# Patient Record
Sex: FEMALE | Race: WHITE | NOT HISPANIC OR LATINO | Employment: FULL TIME | URBAN - METROPOLITAN AREA
[De-identification: names, ages, dates, MRNs, and addresses within clinical notes are randomized per-mention and may not be internally consistent; named-entity substitution may affect disease eponyms.]

---

## 2019-10-04 ENCOUNTER — LAB VISIT (OUTPATIENT)
Dept: LAB | Facility: HOSPITAL | Age: 49
End: 2019-10-04
Attending: INTERNAL MEDICINE
Payer: COMMERCIAL

## 2019-10-04 ENCOUNTER — OFFICE VISIT (OUTPATIENT)
Dept: INTERNAL MEDICINE | Facility: CLINIC | Age: 49
End: 2019-10-04
Payer: COMMERCIAL

## 2019-10-04 VITALS
DIASTOLIC BLOOD PRESSURE: 102 MMHG | HEIGHT: 64 IN | BODY MASS INDEX: 29.24 KG/M2 | OXYGEN SATURATION: 100 % | SYSTOLIC BLOOD PRESSURE: 184 MMHG | HEART RATE: 95 BPM | WEIGHT: 171.31 LBS

## 2019-10-04 DIAGNOSIS — Z12.39 BREAST CANCER SCREENING: ICD-10-CM

## 2019-10-04 DIAGNOSIS — Z00.00 ANNUAL PHYSICAL EXAM: ICD-10-CM

## 2019-10-04 DIAGNOSIS — E03.9 HYPOTHYROIDISM, UNSPECIFIED TYPE: ICD-10-CM

## 2019-10-04 DIAGNOSIS — I10 ESSENTIAL HYPERTENSION: ICD-10-CM

## 2019-10-04 LAB
25(OH)D3+25(OH)D2 SERPL-MCNC: 21 NG/ML (ref 30–96)
ALBUMIN SERPL BCP-MCNC: 4 G/DL (ref 3.5–5.2)
ALP SERPL-CCNC: 64 U/L (ref 55–135)
ALT SERPL W/O P-5'-P-CCNC: 26 U/L (ref 10–44)
ANION GAP SERPL CALC-SCNC: 7 MMOL/L (ref 8–16)
AST SERPL-CCNC: 36 U/L (ref 10–40)
BASOPHILS # BLD AUTO: 0.05 K/UL (ref 0–0.2)
BASOPHILS NFR BLD: 0.7 % (ref 0–1.9)
BILIRUB SERPL-MCNC: 0.7 MG/DL (ref 0.1–1)
BUN SERPL-MCNC: 7 MG/DL (ref 6–20)
CALCIUM SERPL-MCNC: 9.8 MG/DL (ref 8.7–10.5)
CHLORIDE SERPL-SCNC: 104 MMOL/L (ref 95–110)
CHOLEST SERPL-MCNC: 225 MG/DL (ref 120–199)
CHOLEST/HDLC SERPL: 3.3 {RATIO} (ref 2–5)
CO2 SERPL-SCNC: 25 MMOL/L (ref 23–29)
CREAT SERPL-MCNC: 0.8 MG/DL (ref 0.5–1.4)
DIFFERENTIAL METHOD: ABNORMAL
EOSINOPHIL # BLD AUTO: 0.6 K/UL (ref 0–0.5)
EOSINOPHIL NFR BLD: 8.3 % (ref 0–8)
ERYTHROCYTE [DISTWIDTH] IN BLOOD BY AUTOMATED COUNT: 12.9 % (ref 11.5–14.5)
EST. GFR  (AFRICAN AMERICAN): >60 ML/MIN/1.73 M^2
EST. GFR  (NON AFRICAN AMERICAN): >60 ML/MIN/1.73 M^2
ESTIMATED AVG GLUCOSE: 94 MG/DL (ref 68–131)
GLUCOSE SERPL-MCNC: 81 MG/DL (ref 70–110)
HBA1C MFR BLD HPLC: 4.9 % (ref 4–5.6)
HCT VFR BLD AUTO: 40 % (ref 37–48.5)
HDLC SERPL-MCNC: 68 MG/DL (ref 40–75)
HDLC SERPL: 30.2 % (ref 20–50)
HGB BLD-MCNC: 12.5 G/DL (ref 12–16)
IMM GRANULOCYTES # BLD AUTO: 0.02 K/UL (ref 0–0.04)
IMM GRANULOCYTES NFR BLD AUTO: 0.3 % (ref 0–0.5)
LDLC SERPL CALC-MCNC: 136.4 MG/DL (ref 63–159)
LYMPHOCYTES # BLD AUTO: 2.2 K/UL (ref 1–4.8)
LYMPHOCYTES NFR BLD: 32.1 % (ref 18–48)
MCH RBC QN AUTO: 32.8 PG (ref 27–31)
MCHC RBC AUTO-ENTMCNC: 31.3 G/DL (ref 32–36)
MCV RBC AUTO: 105 FL (ref 82–98)
MONOCYTES # BLD AUTO: 0.4 K/UL (ref 0.3–1)
MONOCYTES NFR BLD: 6 % (ref 4–15)
NEUTROPHILS # BLD AUTO: 3.6 K/UL (ref 1.8–7.7)
NEUTROPHILS NFR BLD: 52.6 % (ref 38–73)
NONHDLC SERPL-MCNC: 157 MG/DL
NRBC BLD-RTO: 0 /100 WBC
PLATELET # BLD AUTO: 210 K/UL (ref 150–350)
PMV BLD AUTO: 10.7 FL (ref 9.2–12.9)
POTASSIUM SERPL-SCNC: 4.2 MMOL/L (ref 3.5–5.1)
PROT SERPL-MCNC: 7.9 G/DL (ref 6–8.4)
RBC # BLD AUTO: 3.81 M/UL (ref 4–5.4)
SODIUM SERPL-SCNC: 136 MMOL/L (ref 136–145)
TRIGL SERPL-MCNC: 103 MG/DL (ref 30–150)
TSH SERPL DL<=0.005 MIU/L-ACNC: 3.78 UIU/ML (ref 0.4–4)
WBC # BLD AUTO: 6.86 K/UL (ref 3.9–12.7)

## 2019-10-04 PROCEDURE — 99999 PR PBB SHADOW E&M-NEW PATIENT-LVL III: ICD-10-PCS | Mod: PBBFAC,,, | Performed by: INTERNAL MEDICINE

## 2019-10-04 PROCEDURE — 85025 COMPLETE CBC W/AUTO DIFF WBC: CPT

## 2019-10-04 PROCEDURE — 99386 PREV VISIT NEW AGE 40-64: CPT | Mod: S$GLB,,, | Performed by: INTERNAL MEDICINE

## 2019-10-04 PROCEDURE — 99999 PR PBB SHADOW E&M-NEW PATIENT-LVL III: CPT | Mod: PBBFAC,,, | Performed by: INTERNAL MEDICINE

## 2019-10-04 PROCEDURE — 84443 ASSAY THYROID STIM HORMONE: CPT

## 2019-10-04 PROCEDURE — 82306 VITAMIN D 25 HYDROXY: CPT

## 2019-10-04 PROCEDURE — 83036 HEMOGLOBIN GLYCOSYLATED A1C: CPT

## 2019-10-04 PROCEDURE — 80061 LIPID PANEL: CPT

## 2019-10-04 PROCEDURE — 80053 COMPREHEN METABOLIC PANEL: CPT

## 2019-10-04 PROCEDURE — 99386 PR PREVENTIVE VISIT,NEW,40-64: ICD-10-PCS | Mod: S$GLB,,, | Performed by: INTERNAL MEDICINE

## 2019-10-04 PROCEDURE — 36415 COLL VENOUS BLD VENIPUNCTURE: CPT | Mod: PO

## 2019-10-04 RX ORDER — NORETHINDRONE ACETATE AND ETHINYL ESTRADIOL AND FERROUS FUMARATE 1MG-20(21)
1 KIT ORAL DAILY
Refills: 1 | COMMUNITY
Start: 2019-09-19

## 2019-10-04 RX ORDER — LOSARTAN POTASSIUM AND HYDROCHLOROTHIAZIDE 12.5; 5 MG/1; MG/1
1 TABLET ORAL DAILY
Qty: 30 TABLET | Refills: 1 | Status: SHIPPED | OUTPATIENT
Start: 2019-10-04 | End: 2019-11-27 | Stop reason: SDUPTHER

## 2019-10-04 RX ORDER — LABETALOL 200 MG/1
1 TABLET, FILM COATED ORAL 2 TIMES DAILY
Refills: 0 | COMMUNITY
Start: 2019-09-23 | End: 2019-10-04 | Stop reason: SDUPTHER

## 2019-10-04 RX ORDER — LABETALOL 200 MG/1
200 TABLET, FILM COATED ORAL DAILY
Qty: 30 TABLET | Refills: 1 | Status: SHIPPED | OUTPATIENT
Start: 2019-10-04 | End: 2019-11-27

## 2019-10-04 RX ORDER — LEVOTHYROXINE SODIUM 75 UG/1
75 TABLET ORAL DAILY
Refills: 2 | COMMUNITY
Start: 2019-09-21 | End: 2019-10-04 | Stop reason: SDUPTHER

## 2019-10-04 RX ORDER — LEVOTHYROXINE SODIUM 75 UG/1
75 TABLET ORAL DAILY
Qty: 30 TABLET | Refills: 1 | Status: SHIPPED | OUTPATIENT
Start: 2019-10-04 | End: 2019-12-23

## 2019-10-04 NOTE — PROGRESS NOTES
Subjective:       Patient ID: Agnes Floyd is a 49 y.o. female.    Chief Complaint: [facility]    HPI Mrs. Floyd is 49-year-old female with hypertension and hypothyroidism who presents to [facility] and for annual exam.  She is concerned about her blood pressure.  Reports that her pressure has not been well controlled.  She is currently taking labetalol 200 mg p.o. b.i.d..  In the past she has tried hydrochlorothiazide, but it did not work.  Her PCP did some imaging which showed some very small kidney stenosis.  She was then seen by a nephrologist.  Nephrologist determined that this stenosis was not significant enough to be causing her hypertension.  Nephrologist put her on nifedipine, but this caused swelling.  Patient denies any history of migraine headaches, cardiac arrhythmias, or anxiety disorder.  Although she thinks that her doctor may have thought anxiety was playing a role in her blood pressure. BP at home has been 140s/80-90s.    Review of Systems   All other systems reviewed and are negative.      Objective:      Physical Exam   Constitutional: She is oriented to person, place, and time. She appears well-developed and well-nourished. No distress.   HENT:   Head: Normocephalic and atraumatic.   Right Ear: External ear normal.   Left Ear: External ear normal.   Nose: Nose normal.   Mouth/Throat: Oropharynx is clear and moist. No oropharyngeal exudate.   Eyes: Pupils are equal, round, and reactive to light. Conjunctivae and EOM are normal. Right eye exhibits no discharge. Left eye exhibits no discharge. No scleral icterus.   Cardiovascular: Normal rate, regular rhythm, normal heart sounds and intact distal pulses. Exam reveals no gallop and no friction rub.   No murmur heard.  Pulmonary/Chest: Effort normal and breath sounds normal. No stridor. No respiratory distress. She has no wheezes. She has no rales.   Abdominal: Soft. Bowel sounds are normal. She exhibits no distension and no  mass. There is no tenderness. There is no rebound and no guarding.   Musculoskeletal: She exhibits no edema, tenderness or deformity.   Neurological: She is alert and oriented to person, place, and time.   Skin: Skin is warm and dry. No rash noted. She is not diaphoretic. No erythema.   Psychiatric: She has a normal mood and affect. Her behavior is normal. Judgment and thought content normal.   Vitals reviewed.      Assessment:       1. Annual physical exam    2. Essential hypertension    3. Hypothyroidism, unspecified type        Plan:     PAULA-7 score 1 indicating no anxiety disorder.   Hypertension is not well controlled.  Plan to wean down and off labetalol, so decreasing dose to 200 mg daily.  Starting losartan-hydrochlorothiazide today  Labs as below.  Attempting to obtain Pap smear and mammogram records.  Unable to give tetanus booster vaccine in clinic today       Agnes was seen today for establish care.    Diagnoses and all orders for this visit:    Annual physical exam  -     TSH; Future  -     Comprehensive metabolic panel; Future  -     CBC auto differential; Future  -     Hemoglobin A1c; Future  -     Lipid panel; Future  -     Vitamin D; Future    Essential hypertension  -     losartan-hydrochlorothiazide 50-12.5 mg (HYZAAR) 50-12.5 mg per tablet; Take 1 tablet by mouth once daily.  -     labetalol (NORMODYNE) 200 MG tablet; Take 1 tablet (200 mg total) by mouth once daily.    Hypothyroidism, unspecified type  -     levothyroxine (SYNTHROID) 75 MCG tablet; Take 1 tablet (75 mcg total) by mouth once daily.      RTC 2-4 weeks, f/u HTN

## 2019-10-31 ENCOUNTER — TELEPHONE (OUTPATIENT)
Dept: INTERNAL MEDICINE | Facility: CLINIC | Age: 49
End: 2019-10-31

## 2019-10-31 NOTE — TELEPHONE ENCOUNTER
Spoke with pt, she wanted to cancel appt on Friday, says she received a bill from her [employer] company and was told Dr. Ceballos was out of network.

## 2019-10-31 NOTE — TELEPHONE ENCOUNTER
----- Message from Radha Crenshaw sent at 10/31/2019  2:53 PM CDT -----  Contact: [phone]/self   Patient requesting to speak with you concerning Dr. Ceballos not being under her insurance plan.   Please call back to assist at [phone]

## 2019-11-17 DIAGNOSIS — I10 ESSENTIAL HYPERTENSION: ICD-10-CM

## 2019-11-18 RX ORDER — LOSARTAN POTASSIUM AND HYDROCHLOROTHIAZIDE 12.5; 5 MG/1; MG/1
1 TABLET ORAL DAILY
Qty: 30 TABLET | Refills: 1 | OUTPATIENT
Start: 2019-11-18 | End: 2020-11-17

## 2019-11-27 ENCOUNTER — OFFICE VISIT (OUTPATIENT)
Dept: INTERNAL MEDICINE | Facility: CLINIC | Age: 49
End: 2019-11-27
Payer: COMMERCIAL

## 2019-11-27 VITALS
HEART RATE: 118 BPM | WEIGHT: 168.63 LBS | TEMPERATURE: 99 F | HEIGHT: 64 IN | BODY MASS INDEX: 28.79 KG/M2 | OXYGEN SATURATION: 98 % | DIASTOLIC BLOOD PRESSURE: 98 MMHG | SYSTOLIC BLOOD PRESSURE: 166 MMHG

## 2019-11-27 DIAGNOSIS — J30.9 ALLERGIC RHINITIS, UNSPECIFIED SEASONALITY, UNSPECIFIED TRIGGER: ICD-10-CM

## 2019-11-27 DIAGNOSIS — R00.0 TACHYCARDIA: ICD-10-CM

## 2019-11-27 DIAGNOSIS — I10 ESSENTIAL HYPERTENSION: Primary | ICD-10-CM

## 2019-11-27 PROCEDURE — 99213 OFFICE O/P EST LOW 20 MIN: CPT | Mod: S$GLB,,, | Performed by: INTERNAL MEDICINE

## 2019-11-27 PROCEDURE — 99999 PR PBB SHADOW E&M-EST. PATIENT-LVL III: CPT | Mod: PBBFAC,,, | Performed by: INTERNAL MEDICINE

## 2019-11-27 PROCEDURE — 3008F BODY MASS INDEX DOCD: CPT | Mod: CPTII,S$GLB,, | Performed by: INTERNAL MEDICINE

## 2019-11-27 PROCEDURE — 99999 PR PBB SHADOW E&M-EST. PATIENT-LVL III: ICD-10-PCS | Mod: PBBFAC,,, | Performed by: INTERNAL MEDICINE

## 2019-11-27 PROCEDURE — 99213 PR OFFICE/OUTPT VISIT, EST, LEVL III, 20-29 MIN: ICD-10-PCS | Mod: S$GLB,,, | Performed by: INTERNAL MEDICINE

## 2019-11-27 PROCEDURE — 3008F PR BODY MASS INDEX (BMI) DOCUMENTED: ICD-10-PCS | Mod: CPTII,S$GLB,, | Performed by: INTERNAL MEDICINE

## 2019-11-27 RX ORDER — LABETALOL 100 MG/1
100 TABLET, FILM COATED ORAL 2 TIMES DAILY
Qty: 60 TABLET | Refills: 3 | Status: SHIPPED | OUTPATIENT
Start: 2019-11-27 | End: 2020-03-06

## 2019-11-27 RX ORDER — LOSARTAN POTASSIUM AND HYDROCHLOROTHIAZIDE 12.5; 5 MG/1; MG/1
1 TABLET ORAL DAILY
Qty: 30 TABLET | Refills: 3 | Status: SHIPPED | OUTPATIENT
Start: 2019-11-27 | End: 2020-03-06

## 2019-11-27 NOTE — PATIENT INSTRUCTIONS
Please check your blood pressure at home once daily.  Please write these numbers down and send these numbers to Dr. Ceballos in about two weeks so we can monitor your BP over the patient portal.    You may use the following medications for allergy symptoms: zyrtec OR xyzal once daily. Can also use nasal spray such as flonase OR nasonex, one spray twice daily.  Finally you can use nasal saline rinses once or twice daily

## 2019-11-27 NOTE — PROGRESS NOTES
Subjective:       Patient ID: Agnes Floyd is a 49 y.o. female.    Chief Complaint: Follow-up (1 month bp f/u)    HPI Mrs. Floyd is a 49 year old female with hypertension who presents for follow-up of hypertension.  At last visit, she was given instructions to reduce her labetalol dose. I was going to do a gradual wean of the labetalol medication. However, patient has weaned herself down and now off of the labetalol completely. She has not had labetalol in about 4 weeks. She feels her heart racing or beating fast at times. She felt this before she started the labetalol a few years ago. Reports that she never had any evaluation to see why she was having a fast heart rate. But her prior doctor did suspect that anxiety may be playing a role. Today she is only taking losartan-HCTZ 50-12.5 mg daily and her BP is 166/98 with HR of 118. She denies feeling anxious or nervous in clinic today.  She complains of sinus symptoms such as runny nose and nasal congestion.    Review of Systems   HENT: Positive for congestion and rhinorrhea.    Cardiovascular:        Feels fast heart rate   All other systems reviewed and are negative.      Objective:      Physical Exam   Constitutional: She is oriented to person, place, and time. She appears well-developed and well-nourished. No distress.   HENT:   Head: Normocephalic and atraumatic.   Right Ear: External ear normal.   Left Ear: External ear normal.   Nose: Nose normal.   Eyes: Conjunctivae and EOM are normal.   Cardiovascular: Regular rhythm, normal heart sounds and intact distal pulses. Tachycardia present. Exam reveals no gallop and no friction rub.   No murmur heard.  Pulmonary/Chest: Effort normal and breath sounds normal. No stridor. No respiratory distress. She has no wheezes. She has no rales.   Neurological: She is alert and oriented to person, place, and time.   Skin: Skin is warm and dry. She is not diaphoretic.   Psychiatric: She has a normal mood and affect.  Her behavior is normal. Judgment and thought content normal.   Vitals reviewed.      Assessment:       1. Essential hypertension Sub-optimally controlled   2. Tachycardia Active   3. Allergic rhinitis, unspecified seasonality, unspecified trigger Active       Plan:         Agnes was seen today for follow-up.    Diagnoses and all orders for this visit:    Essential hypertension  Comments:  Add labetalol back at low dose of 100 mg BID. Continue losartan-HCTZ at current dose. See instructions in AVS  Orders:  -     labetalol (NORMODYNE) 100 MG tablet; Take 1 tablet (100 mg total) by mouth 2 (two) times daily.  -     losartan-hydrochlorothiazide 50-12.5 mg (HYZAAR) 50-12.5 mg per tablet; Take 1 tablet by mouth once daily.    Tachycardia  Comments:  Etiology unclear. TSH normal. Will check EKG and Echo when patient returns to clinic. Not checking now at the request of patient due to financial concerns.    Allergic rhinitis, unspecified seasonality, unspecified trigger  Comments:  See instructions in AVS.     RTC in January, f/u HTN and tachycardia

## 2019-12-23 DIAGNOSIS — E03.9 HYPOTHYROIDISM, UNSPECIFIED TYPE: ICD-10-CM

## 2019-12-23 RX ORDER — LEVOTHYROXINE SODIUM 75 UG/1
TABLET ORAL
Qty: 30 TABLET | Refills: 1 | Status: SHIPPED | OUTPATIENT
Start: 2019-12-23 | End: 2020-01-08 | Stop reason: SDUPTHER

## 2020-01-08 DIAGNOSIS — E03.9 HYPOTHYROIDISM, UNSPECIFIED TYPE: ICD-10-CM

## 2020-01-08 RX ORDER — LEVOTHYROXINE SODIUM 75 UG/1
75 TABLET ORAL DAILY
Qty: 30 TABLET | Refills: 1 | Status: SHIPPED | OUTPATIENT
Start: 2020-01-08 | End: 2020-03-06

## 2020-01-31 ENCOUNTER — PATIENT OUTREACH (OUTPATIENT)
Dept: ADMINISTRATIVE | Facility: HOSPITAL | Age: 50
End: 2020-01-31

## 2020-01-31 NOTE — LETTER
January 31, 2020    Agnes Floyd  [address] [facility] [place] [address]         [facility]  [address]  [facility] [address]  Phone: [phone] Dear Agnes Floyd    [facility].  To help you get the most out of each of your visits, we will review your information to make sure you are up to date on all of your recommended tests and/or procedures.      A review of your chart shows you may be due for   Health Maintenance Due   Topic    TETANUS VACCINE     Pap Smear with HPV Cotest     Mammogram     .     If you have had any of the above done at another facility, please bring the records or information with you so that your record at [facility] will be complete.  If you would like to schedule any of these, please contact me.    If you are currently taking medication, please bring it with you to your appointment for review.    Also, if you have any type of Advanced Directives, please bring them with you to your office visit so we may scan them into your chart.    [facility] LPN   Clinical Care Coordinator  [facility] [place]

## 2020-02-05 ENCOUNTER — PATIENT OUTREACH (OUTPATIENT)
Dept: ADMINISTRATIVE | Facility: OTHER | Age: 50
End: 2020-02-05

## 2020-02-07 ENCOUNTER — OFFICE VISIT (OUTPATIENT)
Dept: OTOLARYNGOLOGY | Facility: CLINIC | Age: 50
End: 2020-02-07
Payer: COMMERCIAL

## 2020-02-07 VITALS
BODY MASS INDEX: 28.73 KG/M2 | TEMPERATURE: 97 F | HEIGHT: 64 IN | DIASTOLIC BLOOD PRESSURE: 99 MMHG | WEIGHT: 168.31 LBS | SYSTOLIC BLOOD PRESSURE: 183 MMHG | HEART RATE: 76 BPM

## 2020-02-07 DIAGNOSIS — J34.2 NASAL SEPTAL DEVIATION: ICD-10-CM

## 2020-02-07 DIAGNOSIS — R43.8 HYPOSMIA: ICD-10-CM

## 2020-02-07 DIAGNOSIS — J31.0 CHRONIC RHINITIS: Primary | ICD-10-CM

## 2020-02-07 PROCEDURE — 99203 OFFICE O/P NEW LOW 30 MIN: CPT | Mod: 25,S$GLB,, | Performed by: OTOLARYNGOLOGY

## 2020-02-07 PROCEDURE — 3008F BODY MASS INDEX DOCD: CPT | Mod: CPTII,S$GLB,, | Performed by: OTOLARYNGOLOGY

## 2020-02-07 PROCEDURE — 3077F SYST BP >= 140 MM HG: CPT | Mod: CPTII,S$GLB,, | Performed by: OTOLARYNGOLOGY

## 2020-02-07 PROCEDURE — 3008F PR BODY MASS INDEX (BMI) DOCUMENTED: ICD-10-PCS | Mod: CPTII,S$GLB,, | Performed by: OTOLARYNGOLOGY

## 2020-02-07 PROCEDURE — 99999 PR PBB SHADOW E&M-EST. PATIENT-LVL III: ICD-10-PCS | Mod: PBBFAC,,, | Performed by: OTOLARYNGOLOGY

## 2020-02-07 PROCEDURE — 31231 NASAL ENDOSCOPY DX: CPT | Mod: S$GLB,,, | Performed by: OTOLARYNGOLOGY

## 2020-02-07 PROCEDURE — 3077F PR MOST RECENT SYSTOLIC BLOOD PRESSURE >= 140 MM HG: ICD-10-PCS | Mod: CPTII,S$GLB,, | Performed by: OTOLARYNGOLOGY

## 2020-02-07 PROCEDURE — 3080F PR MOST RECENT DIASTOLIC BLOOD PRESSURE >= 90 MM HG: ICD-10-PCS | Mod: CPTII,S$GLB,, | Performed by: OTOLARYNGOLOGY

## 2020-02-07 PROCEDURE — 31231 PR NASAL ENDOSCOPY, DX: ICD-10-PCS | Mod: S$GLB,,, | Performed by: OTOLARYNGOLOGY

## 2020-02-07 PROCEDURE — 99999 PR PBB SHADOW E&M-EST. PATIENT-LVL III: CPT | Mod: PBBFAC,,, | Performed by: OTOLARYNGOLOGY

## 2020-02-07 PROCEDURE — 99203 PR OFFICE/OUTPT VISIT, NEW, LEVL III, 30-44 MIN: ICD-10-PCS | Mod: 25,S$GLB,, | Performed by: OTOLARYNGOLOGY

## 2020-02-07 PROCEDURE — 3080F DIAST BP >= 90 MM HG: CPT | Mod: CPTII,S$GLB,, | Performed by: OTOLARYNGOLOGY

## 2020-02-07 RX ORDER — AZELASTINE 1 MG/ML
1 SPRAY, METERED NASAL 2 TIMES DAILY
Qty: 30 ML | Refills: 3 | Status: SHIPPED | OUTPATIENT
Start: 2020-02-07 | End: 2022-10-10

## 2020-02-07 RX ORDER — BETAMETHASONE DIPROPIONATE 0.5 MG/G
CREAM TOPICAL 2 TIMES DAILY
Qty: 15 G | Refills: 1 | Status: SHIPPED | OUTPATIENT
Start: 2020-02-07 | End: 2021-02-12

## 2020-02-07 NOTE — PROCEDURES
Procedure: Nasal endoscopy    Anesthesia: Topical xylocaine with joanie-synephrine    Complications: None    Findings: edema, no mass, no polyps, no purulence    Procedure in Detail: After verbal consent obtained and risks, benefits and alternatives discussed with patient, nares were decongested and anesthetized, and a flexible laryngoscope was passed with following results:  Septum deviated to the left. Inferior turbinates with moderate hypertrophy. Middle turbinates edematous. Superior turbinates edematous. Middle meatus seems to be blocked by some edema, but no purulence, no polyps. Superior meatus clear. Sphenoethmoidal recess clear.     Patient tolerated the procedure well.

## 2020-02-07 NOTE — PROGRESS NOTES
Otolaryngology Clinic Note    Agnes Floyd  Encounter Date: 2/7/2020   YOB: 1970  Referring Physician: Aaareferral Self  No address on file   PCP: Thea Ceballos MD    Chief Complaint:   Chief Complaint   Patient presents with    Other     not stuffed up, but has lost sense of smell and taste. Started 4-5 months ago.        HPI: Agnes Floyd is a 49 y.o. female here for decreased smell and taste. Can smell some but it is decreased. Denies any associated URI. No trauma. No prior episodes.    Occasional rhinorrhea but no significant complaint. Intermittent sneezing. Eye will itch and water at times. No facial pressure or pain. No post nasal drip. Generally feels she can breathe out of nose but feels some blockage. No epistaxis. No headaches. No visual changes.    Tried flonase for a few weeks with no difference a while back.    No history of allergies.    Review of Systems   Constitutional: Negative for chills, fever and weight loss.   HENT: Negative for congestion, ear pain, hearing loss and sinus pain.    Eyes: Negative for blurred vision and double vision.   Respiratory: Negative for cough and shortness of breath.    Cardiovascular: Negative for chest pain and palpitations.   Gastrointestinal: Negative for nausea and vomiting.   Musculoskeletal: Negative for joint pain and neck pain.   Skin: Negative for rash.   Neurological: Negative for dizziness, focal weakness and headaches.   Psychiatric/Behavioral: Negative for depression. The patient is not nervous/anxious.         Review of patient's allergies indicates:  No Known Allergies    Past Medical History:   Diagnosis Date    Hypertension     Hypothyroidism        Past Surgical History:   Procedure Laterality Date    TONSILLECTOMY         Social History     Socioeconomic History    Marital status:      Spouse name: Not on file    Number of children: Not on file    Years of education: Not on file    Highest  education level: Not on file   Occupational History    Not on file   Social Needs    Financial resource strain: Not on file    Food insecurity:     Worry: Not on file     Inability: Not on file    Transportation needs:     Medical: Not on file     Non-medical: Not on file   Tobacco Use    Smoking status: Never Smoker    Smokeless tobacco: Never Used   Substance and Sexual Activity    Alcohol use: Yes    Drug use: Never    Sexual activity: Yes     Partners: Male   Lifestyle    Physical activity:     Days per week: Not on file     Minutes per session: Not on file    Stress: Not at all   Relationships    Social connections:     Talks on phone: Not on file     Gets together: Not on file     Attends Buddhist service: Not on file     Active member of club or organization: Not on file     Attends meetings of clubs or organizations: Not on file     Relationship status: Not on file   Other Topics Concern    Not on file   Social History Narrative    Not on file       Family History   Problem Relation Age of Onset    Hypertension Father        Outpatient Encounter Medications as of 2/7/2020   Medication Sig Dispense Refill    JUNEL FE 1/20, 28, 1 mg-20 mcg (21)/75 mg (7) per tablet Take 1 tablet by mouth once daily.  1    labetalol (NORMODYNE) 100 MG tablet Take 1 tablet (100 mg total) by mouth 2 (two) times daily. 60 tablet 3    levothyroxine (SYNTHROID) 75 MCG tablet Take 1 tablet (75 mcg total) by mouth once daily. 30 tablet 1    losartan-hydrochlorothiazide 50-12.5 mg (HYZAAR) 50-12.5 mg per tablet Take 1 tablet by mouth once daily. 30 tablet 3    azelastine (ASTELIN) 137 mcg (0.1 %) nasal spray 1 spray (137 mcg total) by Nasal route 2 (two) times daily. 30 mL 3    betamethasone dipropionate (DIPROLENE) 0.05 % cream Apply topically 2 (two) times daily. Use 2 cm strip in sinus rinse twice daily 15 g 1     No facility-administered encounter medications on file as of 2/7/2020.        Physical  Exam:    Vitals:    02/07/20 1033   BP: (!) 183/99   Pulse: 76   Temp: 97.2 °F (36.2 °C)       Constitutional  General Appearance: well nourished, well-developed, alert, oriented, in no acute distress  Communication: ability, understanding, voice quality normal  Head and Face  Inspection: normocephalic, atraumatic, no scars, lesions or masses    Palpation: no stepoffs, sinus tenderness or masses  Parotid glands: no masses, stones, swelling or tenderness  Eyes  Ocular Motility / Alignment: normal alignment, motility, no proptosis, enophthalmus or nystagmus  Conjunctiva: not injected  Eyelids: no entropion or ectropion, no edema  Ears  Hearing: speech reception thresholds grossly normal  External Ears: no auricle lesions, non-tender, mobile to palpation  Otoscopy:  Right Ear: no tympanic membrane lesions, perforations, or effusion, normal EAC  Left Ear: no tympanic membrane lesions, perforations, or effusion, normal EAC  Nose  External Nose: no lesions, tenderness, trauma or deformity  Intranasal Exam: see procedure note - left septal deviation  Oral Cavity / Oropharynx  Lips: upper and lower lips pink and moist  Teeth: dentition good  Gingiva: healthy  Oral Mucosa: moist, no mucosal lesions  Floor of Mouth: normal, no lesions  Tongue: moist, normal mobility, no lesions  Palate: soft and hard palates without lesions or ulcers  Oropharynx: tonsils and walls without erythema, exudate, lesions, fullness or obstruction  Neck  Inspection and Palpation: no erythema, induration, emphysema, tenderness or masses  Larynx and Trachea: normal position and mobility   Thyroid: no tenderness, enlargement or nodules  Submandibular Glands: no masses or tenderness  Lymphatic:  Anterior, Posterior, Submandibular, Submental, Supraclavicular: no lymphadenopathy present  Chest / Respiratory  Chest: no stridor or retractions, normal effort and expansion  Cardiovascular:  Pulses: 2+ radial pulses, regular rhythm and rate  Auscultation:  deferred  Neurological  Cranial Nerves: grossly intact  General: no focal deficits  Psychiatric  Orientation: oriented to time, place and person  Mood and Affect: no depression, anxiety or agitation  Extremities  Inspection: moves all extremities well    Procedures:  Procedure: Nasal endoscopy    Anesthesia: Topical xylocaine with joanie-synephrine    Complications: None    Findings: edema, no mass, no polyps, no purulence    Procedure in Detail: After verbal consent obtained and risks, benefits and alternatives discussed with patient, nares were decongested and anesthetized, and a flexible laryngoscope was passed with following results:  Septum deviated to the left. Inferior turbinates with moderate hypertrophy. Middle turbinates edematous. Superior turbinates edematous. Middle meatus seems to be blocked by some edema, but no purulence, no polyps. Superior meatus clear. Sphenoethmoidal recess clear.     Patient tolerated the procedure well.    Pertinent Data:  ? LABS:   ? AUDIO:  ? PATH:      I personally reviewed the following pertinent data at today's visit:    Imaging:   ? XRAY:  ? Ultrasound:  ? CT Scan:  ? MRI Scan:  ? PET/CT Scan:    I personally reviewed the following images:    Summary of Outside Records:      Assessment and Plan:  Agnes Floyd is a 49 y.o. female with     Chronic rhinitis  -     betamethasone dipropionate (DIPROLENE) 0.05 % cream; Apply topically 2 (two) times daily. Use 2 cm strip in sinus rinse twice daily  Dispense: 15 g; Refill: 1  -     azelastine (ASTELIN) 137 mcg (0.1 %) nasal spray; 1 spray (137 mcg total) by Nasal route 2 (two) times daily.  Dispense: 30 mL; Refill: 3    Nasal septal deviation    Hyposmia       Patient with some nasal mucosal edema, no obvious polyps, purulence or mass. Will start on nasal steroid irrigations and some astelin. Discussed options for imaging. Patient with no other concerning symptoms to suspect tumor as cause. No history of trauma. Seems  reasonable to wait to see if she responds to steroid rinses. RTC in 4 weeks. If no change, will consider MRI at next visit.        E. Grier de Lauréal, MD [facility]

## 2020-02-14 ENCOUNTER — OFFICE VISIT (OUTPATIENT)
Dept: INTERNAL MEDICINE | Facility: CLINIC | Age: 50
End: 2020-02-14
Payer: COMMERCIAL

## 2020-02-14 VITALS
OXYGEN SATURATION: 100 % | BODY MASS INDEX: 28.72 KG/M2 | DIASTOLIC BLOOD PRESSURE: 90 MMHG | WEIGHT: 167.31 LBS | HEART RATE: 97 BPM | SYSTOLIC BLOOD PRESSURE: 150 MMHG

## 2020-02-14 DIAGNOSIS — I10 ESSENTIAL HYPERTENSION: ICD-10-CM

## 2020-02-14 DIAGNOSIS — R00.0 TACHYCARDIA: ICD-10-CM

## 2020-02-14 DIAGNOSIS — I10 WHITE COAT SYNDROME WITH DIAGNOSIS OF HYPERTENSION: ICD-10-CM

## 2020-02-14 PROCEDURE — 99213 PR OFFICE/OUTPT VISIT, EST, LEVL III, 20-29 MIN: ICD-10-PCS | Mod: S$GLB,,, | Performed by: INTERNAL MEDICINE

## 2020-02-14 PROCEDURE — 93010 EKG 12-LEAD: ICD-10-PCS | Mod: S$GLB,,, | Performed by: INTERNAL MEDICINE

## 2020-02-14 PROCEDURE — 3078F PR MOST RECENT DIASTOLIC BLOOD PRESSURE < 80 MM HG: ICD-10-PCS | Mod: CPTII,S$GLB,, | Performed by: INTERNAL MEDICINE

## 2020-02-14 PROCEDURE — 99999 PR PBB SHADOW E&M-EST. PATIENT-LVL III: CPT | Mod: PBBFAC,,, | Performed by: INTERNAL MEDICINE

## 2020-02-14 PROCEDURE — 93005 EKG 12-LEAD: ICD-10-PCS | Mod: S$GLB,,, | Performed by: INTERNAL MEDICINE

## 2020-02-14 PROCEDURE — 3074F SYST BP LT 130 MM HG: CPT | Mod: CPTII,S$GLB,, | Performed by: INTERNAL MEDICINE

## 2020-02-14 PROCEDURE — 93010 ELECTROCARDIOGRAM REPORT: CPT | Mod: S$GLB,,, | Performed by: INTERNAL MEDICINE

## 2020-02-14 PROCEDURE — 3008F PR BODY MASS INDEX (BMI) DOCUMENTED: ICD-10-PCS | Mod: CPTII,S$GLB,, | Performed by: INTERNAL MEDICINE

## 2020-02-14 PROCEDURE — 3008F BODY MASS INDEX DOCD: CPT | Mod: CPTII,S$GLB,, | Performed by: INTERNAL MEDICINE

## 2020-02-14 PROCEDURE — 3074F PR MOST RECENT SYSTOLIC BLOOD PRESSURE < 130 MM HG: ICD-10-PCS | Mod: CPTII,S$GLB,, | Performed by: INTERNAL MEDICINE

## 2020-02-14 PROCEDURE — 99213 OFFICE O/P EST LOW 20 MIN: CPT | Mod: S$GLB,,, | Performed by: INTERNAL MEDICINE

## 2020-02-14 PROCEDURE — 99999 PR PBB SHADOW E&M-EST. PATIENT-LVL III: ICD-10-PCS | Mod: PBBFAC,,, | Performed by: INTERNAL MEDICINE

## 2020-02-14 PROCEDURE — 3078F DIAST BP <80 MM HG: CPT | Mod: CPTII,S$GLB,, | Performed by: INTERNAL MEDICINE

## 2020-02-14 PROCEDURE — 93005 ELECTROCARDIOGRAM TRACING: CPT | Mod: S$GLB,,, | Performed by: INTERNAL MEDICINE

## 2020-02-14 NOTE — PROGRESS NOTES
Patient ID: Agnes Floyd is a 49 y.o. female.    Chief Complaint: Hypertension (follow up )    HPI Agnes is a 49 y.o. female With hypertension and tachycardia who presents for follow-up of hypertension.  At last visit, she was restarted on labetalol.  She is currently compliant with labetalol 100 mg b.i.d. And losartan-hydrochlorothiazide.  Patient reports checking her blood pressure at home and seeing good numbers such as systolic 117-120s and diastolic 80s. Thinks she gets nervous at the doctor's office and this causes BP to go up. BP was elevated at recent visit with ENT  No acute complaints or concerns today.    Review of Systems   All other systems reviewed and are negative.        Objective:     Vitals:    02/14/20 1306   BP: (!) 150/90   Pulse:         Physical Exam   Constitutional: She is oriented to person, place, and time. She appears well-developed and well-nourished.   HENT:   Head: Normocephalic and atraumatic.   Right Ear: External ear normal.   Left Ear: External ear normal.   Nose: Nose normal.   Eyes: Conjunctivae and EOM are normal.   Neurological: She is alert and oriented to person, place, and time.   Skin: Skin is warm and dry. She is not diaphoretic.   Psychiatric: She has a normal mood and affect. Her behavior is normal. Judgment and thought content normal.   Vitals reviewed.      Assessment:       1. Essential hypertension Well controlled   2. Tachycardia Active   3. White coat syndrome with diagnosis of hypertension Active       Plan:         Essential hypertension  Comments:  Appears to be well controlled at home. Continue to monitor at home. See AVS for instructions. Continue current meds  Orders:  -     Comprehensive metabolic panel; Future; Expected date: 05/14/2020    Tachycardia  Comments:  Etiology unclear. Possibly due to anxiety? TSH normal. EKG normal. Will check Echo  Orders:  -     EKG 12-lead; Future  -     Echo Color Flow Doppler? Yes; Future    White coat syndrome  with diagnosis of hypertension  Comments:  Appears to be well controlled at home. Continue to monitor at home. See AVS for instructions. Continue current meds        RTC 4-5 months, f/u HTN    Warning signs discussed, patient to call with any further issues or worsening of symptoms.       Parts of the above note were dictated using a voice dictation software. Please excuse any grammatical or typographical errors.

## 2020-02-19 ENCOUNTER — TELEPHONE (OUTPATIENT)
Dept: ADMINISTRATIVE | Facility: OTHER | Age: 50
End: 2020-02-19

## 2020-03-06 DIAGNOSIS — E03.9 HYPOTHYROIDISM, UNSPECIFIED TYPE: ICD-10-CM

## 2020-03-06 DIAGNOSIS — I10 ESSENTIAL HYPERTENSION: ICD-10-CM

## 2020-03-06 RX ORDER — LABETALOL 100 MG/1
TABLET, FILM COATED ORAL
Qty: 60 TABLET | Refills: 3 | Status: SHIPPED | OUTPATIENT
Start: 2020-03-06 | End: 2020-07-06

## 2020-03-06 RX ORDER — LOSARTAN POTASSIUM AND HYDROCHLOROTHIAZIDE 12.5; 5 MG/1; MG/1
1 TABLET ORAL DAILY
Qty: 30 TABLET | Refills: 3 | Status: SHIPPED | OUTPATIENT
Start: 2020-03-06 | End: 2020-04-24 | Stop reason: ALTCHOICE

## 2020-03-06 RX ORDER — LEVOTHYROXINE SODIUM 75 UG/1
TABLET ORAL
Qty: 30 TABLET | Refills: 1 | Status: SHIPPED | OUTPATIENT
Start: 2020-03-06 | End: 2020-07-24

## 2020-04-24 RX ORDER — LOSARTAN POTASSIUM 50 MG/1
50 TABLET ORAL DAILY
Qty: 90 TABLET | Refills: 0 | Status: SHIPPED | OUTPATIENT
Start: 2020-04-24 | End: 2020-05-20

## 2020-04-24 RX ORDER — HYDROCHLOROTHIAZIDE 12.5 MG/1
12.5 TABLET ORAL DAILY
Qty: 30 TABLET | Refills: 0 | Status: SHIPPED | OUTPATIENT
Start: 2020-04-24 | End: 2020-05-20

## 2020-05-20 RX ORDER — LOSARTAN POTASSIUM 50 MG/1
TABLET ORAL
Qty: 90 TABLET | Refills: 0 | Status: SHIPPED | OUTPATIENT
Start: 2020-05-20 | End: 2020-12-31

## 2020-05-20 RX ORDER — HYDROCHLOROTHIAZIDE 12.5 MG/1
TABLET ORAL
Qty: 30 TABLET | Refills: 0 | Status: SHIPPED | OUTPATIENT
Start: 2020-05-20 | End: 2020-07-06

## 2020-06-11 ENCOUNTER — PATIENT OUTREACH (OUTPATIENT)
Dept: ADMINISTRATIVE | Facility: HOSPITAL | Age: 50
End: 2020-06-11

## 2020-06-17 ENCOUNTER — TELEPHONE (OUTPATIENT)
Dept: INTERNAL MEDICINE | Facility: CLINIC | Age: 50
End: 2020-06-17

## 2020-08-19 ENCOUNTER — LAB VISIT (OUTPATIENT)
Dept: LAB | Facility: HOSPITAL | Age: 50
End: 2020-08-19
Attending: INTERNAL MEDICINE
Payer: COMMERCIAL

## 2020-08-19 DIAGNOSIS — I10 ESSENTIAL HYPERTENSION: ICD-10-CM

## 2020-08-19 PROCEDURE — 36415 COLL VENOUS BLD VENIPUNCTURE: CPT | Mod: PO

## 2020-08-19 PROCEDURE — 80053 COMPREHEN METABOLIC PANEL: CPT

## 2020-08-20 LAB
ALBUMIN SERPL BCP-MCNC: 3.8 G/DL (ref 3.5–5.2)
ALP SERPL-CCNC: 62 U/L (ref 55–135)
ALT SERPL W/O P-5'-P-CCNC: 62 U/L (ref 10–44)
ANION GAP SERPL CALC-SCNC: 10 MMOL/L (ref 8–16)
AST SERPL-CCNC: 80 U/L (ref 10–40)
BILIRUB SERPL-MCNC: 0.5 MG/DL (ref 0.1–1)
BUN SERPL-MCNC: 8 MG/DL (ref 6–20)
CALCIUM SERPL-MCNC: 9.5 MG/DL (ref 8.7–10.5)
CHLORIDE SERPL-SCNC: 101 MMOL/L (ref 95–110)
CO2 SERPL-SCNC: 25 MMOL/L (ref 23–29)
CREAT SERPL-MCNC: 0.7 MG/DL (ref 0.5–1.4)
EST. GFR  (AFRICAN AMERICAN): >60 ML/MIN/1.73 M^2
EST. GFR  (NON AFRICAN AMERICAN): >60 ML/MIN/1.73 M^2
GLUCOSE SERPL-MCNC: 81 MG/DL (ref 70–110)
POTASSIUM SERPL-SCNC: 4 MMOL/L (ref 3.5–5.1)
PROT SERPL-MCNC: 7.7 G/DL (ref 6–8.4)
SODIUM SERPL-SCNC: 136 MMOL/L (ref 136–145)

## 2020-08-21 ENCOUNTER — HOSPITAL ENCOUNTER (OUTPATIENT)
Dept: RADIOLOGY | Facility: HOSPITAL | Age: 50
Discharge: HOME | End: 2020-08-21
Attending: INTERNAL MEDICINE
Payer: COMMERCIAL

## 2020-08-21 ENCOUNTER — OFFICE VISIT (OUTPATIENT)
Dept: INTERNAL MEDICINE | Facility: CLINIC | Age: 50
End: 2020-08-21
Payer: COMMERCIAL

## 2020-08-21 VITALS
BODY MASS INDEX: 29.29 KG/M2 | TEMPERATURE: 97 F | HEART RATE: 97 BPM | OXYGEN SATURATION: 98 % | SYSTOLIC BLOOD PRESSURE: 128 MMHG | DIASTOLIC BLOOD PRESSURE: 88 MMHG | WEIGHT: 170.63 LBS

## 2020-08-21 DIAGNOSIS — Z00.00 ANNUAL PHYSICAL EXAM: ICD-10-CM

## 2020-08-21 DIAGNOSIS — R06.2 WHEEZING: ICD-10-CM

## 2020-08-21 DIAGNOSIS — J30.1 SEASONAL ALLERGIC RHINITIS DUE TO POLLEN: ICD-10-CM

## 2020-08-21 DIAGNOSIS — I10 ESSENTIAL HYPERTENSION: ICD-10-CM

## 2020-08-21 DIAGNOSIS — R74.8 ELEVATED LIVER ENZYMES: ICD-10-CM

## 2020-08-21 PROCEDURE — 99214 OFFICE O/P EST MOD 30 MIN: CPT | Mod: S$GLB,,, | Performed by: INTERNAL MEDICINE

## 2020-08-21 PROCEDURE — 99999 PR PBB SHADOW E&M-EST. PATIENT-LVL IV: CPT | Mod: PBBFAC,,, | Performed by: INTERNAL MEDICINE

## 2020-08-21 PROCEDURE — 3074F PR MOST RECENT SYSTOLIC BLOOD PRESSURE < 130 MM HG: ICD-10-PCS | Mod: CPTII,S$GLB,, | Performed by: INTERNAL MEDICINE

## 2020-08-21 PROCEDURE — 71046 XR CHEST PA AND LATERAL: ICD-10-PCS | Mod: 26,,, | Performed by: RADIOLOGY

## 2020-08-21 PROCEDURE — 71046 X-RAY EXAM CHEST 2 VIEWS: CPT | Mod: 26,,, | Performed by: RADIOLOGY

## 2020-08-21 PROCEDURE — 3079F DIAST BP 80-89 MM HG: CPT | Mod: CPTII,S$GLB,, | Performed by: INTERNAL MEDICINE

## 2020-08-21 PROCEDURE — 99999 PR PBB SHADOW E&M-EST. PATIENT-LVL IV: ICD-10-PCS | Mod: PBBFAC,,, | Performed by: INTERNAL MEDICINE

## 2020-08-21 PROCEDURE — 3079F PR MOST RECENT DIASTOLIC BLOOD PRESSURE 80-89 MM HG: ICD-10-PCS | Mod: CPTII,S$GLB,, | Performed by: INTERNAL MEDICINE

## 2020-08-21 PROCEDURE — 3074F SYST BP LT 130 MM HG: CPT | Mod: CPTII,S$GLB,, | Performed by: INTERNAL MEDICINE

## 2020-08-21 PROCEDURE — 99214 PR OFFICE/OUTPT VISIT, EST, LEVL IV, 30-39 MIN: ICD-10-PCS | Mod: S$GLB,,, | Performed by: INTERNAL MEDICINE

## 2020-08-21 PROCEDURE — 71046 X-RAY EXAM CHEST 2 VIEWS: CPT | Mod: TC,FY,PO

## 2020-08-21 PROCEDURE — 3008F PR BODY MASS INDEX (BMI) DOCUMENTED: ICD-10-PCS | Mod: CPTII,S$GLB,, | Performed by: INTERNAL MEDICINE

## 2020-08-21 PROCEDURE — 3008F BODY MASS INDEX DOCD: CPT | Mod: CPTII,S$GLB,, | Performed by: INTERNAL MEDICINE

## 2020-08-21 RX ORDER — ALBUTEROL SULFATE 90 UG/1
2 AEROSOL, METERED RESPIRATORY (INHALATION) EVERY 4 HOURS PRN
Qty: 18 G | Refills: 1 | Status: SHIPPED | OUTPATIENT
Start: 2020-08-21 | End: 2021-05-07

## 2020-08-21 RX ORDER — METHYLPREDNISOLONE 4 MG/1
TABLET ORAL
Qty: 21 TABLET | Refills: 0 | Status: SHIPPED | OUTPATIENT
Start: 2020-08-21 | End: 2020-09-11

## 2020-08-21 NOTE — PROGRESS NOTES
Patient ID: Agnes Floyd is a 50 y.o. female.    Chief Complaint: Follow-up (medications)    HPI Agnes is a 50 y.o. female with seasonal allergies, HTN and anxiety who presents for follow up of HTN. Reports BP is good at home such as 120/77. Systolic pressure never >130 at home. She is compliant with labetalol and losartan. Complains of seasonal allergy symptoms. Onset was 2 weeks ago. She takes claritin as needed. Not using any nasal rinses or nasal sprays now. Nothing making symptoms better. Having runny nose with associated symptoms of cough and sore throat. No associated ear pain or postnasal drip. Symptoms are constant in duration.    Review of Systems   HENT: Positive for rhinorrhea and sore throat. Negative for ear pain and postnasal drip.    Respiratory: Positive for cough.    All other systems reviewed and are negative.      Objective:     Vitals:    08/21/20 0922   BP: 128/88   Pulse:    Temp:         Physical Exam  Vitals signs reviewed.   Constitutional:       General: She is not in acute distress.     Appearance: Normal appearance. She is well-developed and normal weight. She is not toxic-appearing or diaphoretic.   HENT:      Head: Normocephalic and atraumatic.      Right Ear: External ear normal.      Left Ear: External ear normal.      Nose: Nose normal.   Eyes:      General: No scleral icterus.        Right eye: No discharge.         Left eye: No discharge.      Extraocular Movements: Extraocular movements intact.      Conjunctiva/sclera: Conjunctivae normal.   Cardiovascular:      Rate and Rhythm: Normal rate and regular rhythm.      Heart sounds: Normal heart sounds. No murmur. No friction rub. No gallop.    Pulmonary:      Effort: Pulmonary effort is normal. No respiratory distress.      Breath sounds: No stridor. Wheezing (mild, expiratory, diffuse) and rhonchi (mild, diffuse, bilateral) present. No rales.   Skin:     General: Skin is warm and dry.   Neurological:      General: No  focal deficit present.      Mental Status: She is alert and oriented to person, place, and time. Mental status is at baseline.   Psychiatric:         Mood and Affect: Mood normal.         Behavior: Behavior normal.         Thought Content: Thought content normal.         Judgment: Judgment normal.         Assessment:       1. Essential hypertension Well controlled   2. Seasonal allergic rhinitis due to pollen Active   3. Wheezing Active   4. Elevated liver enzymes Active   5. Annual physical exam        Plan:         Essential hypertension  Comments:  Continue current medications    Seasonal allergic rhinitis due to pollen  Comments:  See instructions in AVS    Wheezing  -     X-Ray Chest PA And Lateral; Future; Expected date: 08/21/2020  -     albuterol (PROVENTIL/VENTOLIN HFA) 90 mcg/actuation inhaler; Inhale 2 puffs into the lungs every 4 (four) hours as needed for Wheezing or Shortness of Breath (or cough).  Dispense: 18 g; Refill: 1  -     methylPREDNISolone (MEDROL DOSEPACK) 4 mg tablet; use as directed  Dispense: 21 tablet; Refill: 0    Elevated liver enzymes  Comments:  see instructions in AVS  Orders:  -     Comprehensive metabolic panel; Future; Expected date: 11/21/2020    Annual physical exam  -     Comprehensive metabolic panel; Future; Expected date: 11/21/2020  -     CBC auto differential; Future; Expected date: 11/21/2020  -     Hemoglobin A1C; Future; Expected date: 11/21/2020  -     Lipid Panel; Future; Expected date: 11/21/2020  -     TSH; Future; Expected date: 11/21/2020  -     Hepatitis C Antibody; Future; Expected date: 11/21/2020  -     HIV 1/2 Ag/Ab (4th Gen); Future; Expected date: 11/21/2020        RTC December 2020 for annual exam    Warning signs discussed, patient to call with any further issues or worsening of symptoms.       Parts of the above note were dictated using a voice dictation software. Please excuse any grammatical or typographical errors.

## 2020-08-21 NOTE — PATIENT INSTRUCTIONS
Try over the counter xyzal 5 mg by mouth once daily. Use flonase nasal spray, one spray in each nostril twice daily. Use this regimen for the next one month. After one month, you may continue the regimen or discontinue it with the plans to restart if symptoms return.     I am prescribing steroid medication and albuterol medication for relief as well.    No alcohol, no tylenol and will repeat liver labs in 6 weeks

## 2020-09-25 ENCOUNTER — PATIENT MESSAGE (OUTPATIENT)
Dept: OTHER | Facility: OTHER | Age: 50
End: 2020-09-25

## 2020-10-05 ENCOUNTER — PATIENT MESSAGE (OUTPATIENT)
Dept: ADMINISTRATIVE | Facility: HOSPITAL | Age: 50
End: 2020-10-05

## 2020-12-04 DIAGNOSIS — Z12.31 OTHER SCREENING MAMMOGRAM: ICD-10-CM

## 2020-12-11 ENCOUNTER — PATIENT MESSAGE (OUTPATIENT)
Dept: OTHER | Facility: OTHER | Age: 50
End: 2020-12-11

## 2020-12-11 DIAGNOSIS — I10 ESSENTIAL HYPERTENSION: ICD-10-CM

## 2020-12-11 RX ORDER — LABETALOL 100 MG/1
TABLET, FILM COATED ORAL
Qty: 60 TABLET | Refills: 0 | Status: SHIPPED | OUTPATIENT
Start: 2020-12-11 | End: 2021-01-11

## 2020-12-11 RX ORDER — HYDROCHLOROTHIAZIDE 12.5 MG/1
TABLET ORAL
Qty: 30 TABLET | Refills: 0 | Status: SHIPPED | OUTPATIENT
Start: 2020-12-11 | End: 2021-01-11

## 2020-12-19 ENCOUNTER — CLINICAL SUPPORT (OUTPATIENT)
Dept: URGENT CARE | Facility: CLINIC | Age: 50
End: 2020-12-19
Payer: COMMERCIAL

## 2020-12-19 DIAGNOSIS — Z20.822 COVID-19 RULED OUT: Primary | ICD-10-CM

## 2020-12-19 LAB
CTP QC/QA: YES
SARS-COV-2 RDRP RESP QL NAA+PROBE: NEGATIVE

## 2020-12-19 PROCEDURE — U0002: ICD-10-PCS | Mod: QW,S$GLB,, | Performed by: PHYSICIAN ASSISTANT

## 2020-12-19 PROCEDURE — U0002 COVID-19 LAB TEST NON-CDC: HCPCS | Mod: QW,S$GLB,, | Performed by: PHYSICIAN ASSISTANT

## 2020-12-19 NOTE — PROGRESS NOTES
CDC Testing and Quarantine Guidelines for Exposure:         A close exposure is defined as anyone who has had an exposure (masked or unmasked) to a known COVID -19 positive person within 6 ft for longer than 15 minutes. If your exposure meets this definition you are required by CDC guidelines to quarantine for at least 7-10 days from time of exposure. The CDC states that a test can be performed for an asymptomatic patient (someone who does not have any symptoms) after a close exposure, and that a test should be done if you develop symptoms after a close exposure as described above.  Specifically, you can test at day 5 or later if asymptomatic, in order to get released from quarantine on day 7 or later.  If you develop symptoms sooner, you should test when your symptoms start.  If you meet the definition of a close exposure, it will not matter whether you are experiencing symptoms- a quarantine for at least 7-10 days after a close exposure is required by CDC guidelines.  Please note, if you decide to test as an asymptomatic during your quarantine and you are positive, you will be restarting your quarantine and moving from a possible 10 day quarantine (if you do not test), to a 11 day or greater quarantine.  The CDC also suggests people still monitor for symptoms for a full 14 days and remember that the shorter quarantine options do not replace initial CDC guidance.  The CDC continues to recommend quarantining for 14 days as the best way to reduce risk for spreading COVID-19 - however, this is only a recommendation.  If your exposure does not meet the above definition, you can return to your normal daily activities to include social distancing, wearing a mask and frequent handwashing.

## 2021-01-05 ENCOUNTER — PATIENT MESSAGE (OUTPATIENT)
Dept: ADMINISTRATIVE | Facility: HOSPITAL | Age: 51
End: 2021-01-05

## 2021-02-10 ENCOUNTER — LAB VISIT (OUTPATIENT)
Dept: LAB | Facility: HOSPITAL | Age: 51
End: 2021-02-10
Attending: INTERNAL MEDICINE
Payer: COMMERCIAL

## 2021-02-10 DIAGNOSIS — R74.8 ELEVATED LIVER ENZYMES: ICD-10-CM

## 2021-02-10 DIAGNOSIS — Z00.00 ANNUAL PHYSICAL EXAM: ICD-10-CM

## 2021-02-10 LAB
BASOPHILS # BLD AUTO: 0.05 K/UL (ref 0–0.2)
BASOPHILS NFR BLD: 0.8 % (ref 0–1.9)
DIFFERENTIAL METHOD: ABNORMAL
EOSINOPHIL # BLD AUTO: 0.4 K/UL (ref 0–0.5)
EOSINOPHIL NFR BLD: 6.8 % (ref 0–8)
ERYTHROCYTE [DISTWIDTH] IN BLOOD BY AUTOMATED COUNT: 12.8 % (ref 11.5–14.5)
ESTIMATED AVG GLUCOSE: 94 MG/DL (ref 68–131)
HBA1C MFR BLD: 4.9 % (ref 4–5.6)
HCT VFR BLD AUTO: 39.4 % (ref 37–48.5)
HGB BLD-MCNC: 12.6 G/DL (ref 12–16)
IMM GRANULOCYTES # BLD AUTO: 0.01 K/UL (ref 0–0.04)
IMM GRANULOCYTES NFR BLD AUTO: 0.2 % (ref 0–0.5)
LYMPHOCYTES # BLD AUTO: 2 K/UL (ref 1–4.8)
LYMPHOCYTES NFR BLD: 32.7 % (ref 18–48)
MCH RBC QN AUTO: 33.4 PG (ref 27–31)
MCHC RBC AUTO-ENTMCNC: 32 G/DL (ref 32–36)
MCV RBC AUTO: 105 FL (ref 82–98)
MONOCYTES # BLD AUTO: 0.4 K/UL (ref 0.3–1)
MONOCYTES NFR BLD: 6.3 % (ref 4–15)
NEUTROPHILS # BLD AUTO: 3.2 K/UL (ref 1.8–7.7)
NEUTROPHILS NFR BLD: 53.2 % (ref 38–73)
NRBC BLD-RTO: 0 /100 WBC
PLATELET # BLD AUTO: 217 K/UL (ref 150–350)
PMV BLD AUTO: 10.4 FL (ref 9.2–12.9)
RBC # BLD AUTO: 3.77 M/UL (ref 4–5.4)
WBC # BLD AUTO: 6 K/UL (ref 3.9–12.7)

## 2021-02-10 PROCEDURE — 84439 ASSAY OF FREE THYROXINE: CPT

## 2021-02-10 PROCEDURE — 36415 COLL VENOUS BLD VENIPUNCTURE: CPT | Mod: PO

## 2021-02-10 PROCEDURE — 80053 COMPREHEN METABOLIC PANEL: CPT

## 2021-02-10 PROCEDURE — 86703 HIV-1/HIV-2 1 RESULT ANTBDY: CPT

## 2021-02-10 PROCEDURE — 85025 COMPLETE CBC W/AUTO DIFF WBC: CPT

## 2021-02-10 PROCEDURE — 83036 HEMOGLOBIN GLYCOSYLATED A1C: CPT

## 2021-02-10 PROCEDURE — 80061 LIPID PANEL: CPT

## 2021-02-10 PROCEDURE — 86803 HEPATITIS C AB TEST: CPT

## 2021-02-10 PROCEDURE — 84443 ASSAY THYROID STIM HORMONE: CPT

## 2021-02-11 LAB
ALBUMIN SERPL BCP-MCNC: 3.9 G/DL (ref 3.5–5.2)
ALP SERPL-CCNC: 76 U/L (ref 55–135)
ALT SERPL W/O P-5'-P-CCNC: 41 U/L (ref 10–44)
ANION GAP SERPL CALC-SCNC: 12 MMOL/L (ref 8–16)
AST SERPL-CCNC: 42 U/L (ref 10–40)
BILIRUB SERPL-MCNC: 1.3 MG/DL (ref 0.1–1)
BUN SERPL-MCNC: 9 MG/DL (ref 6–20)
CALCIUM SERPL-MCNC: 9.4 MG/DL (ref 8.7–10.5)
CHLORIDE SERPL-SCNC: 97 MMOL/L (ref 95–110)
CHOLEST SERPL-MCNC: 240 MG/DL (ref 120–199)
CHOLEST/HDLC SERPL: 3.4 {RATIO} (ref 2–5)
CO2 SERPL-SCNC: 22 MMOL/L (ref 23–29)
CREAT SERPL-MCNC: 0.8 MG/DL (ref 0.5–1.4)
EST. GFR  (AFRICAN AMERICAN): >60 ML/MIN/1.73 M^2
EST. GFR  (NON AFRICAN AMERICAN): >60 ML/MIN/1.73 M^2
GLUCOSE SERPL-MCNC: 89 MG/DL (ref 70–110)
HCV AB SERPL QL IA: NEGATIVE
HDLC SERPL-MCNC: 71 MG/DL (ref 40–75)
HDLC SERPL: 29.6 % (ref 20–50)
HIV 1+2 AB+HIV1 P24 AG SERPL QL IA: NEGATIVE
LDLC SERPL CALC-MCNC: 142.2 MG/DL (ref 63–159)
NONHDLC SERPL-MCNC: 169 MG/DL
POTASSIUM SERPL-SCNC: 3.5 MMOL/L (ref 3.5–5.1)
PROT SERPL-MCNC: 7.7 G/DL (ref 6–8.4)
SODIUM SERPL-SCNC: 131 MMOL/L (ref 136–145)
T4 FREE SERPL-MCNC: 0.97 NG/DL (ref 0.71–1.51)
TRIGL SERPL-MCNC: 134 MG/DL (ref 30–150)
TSH SERPL DL<=0.005 MIU/L-ACNC: 4.15 UIU/ML (ref 0.4–4)

## 2021-02-12 ENCOUNTER — OFFICE VISIT (OUTPATIENT)
Dept: INTERNAL MEDICINE | Facility: CLINIC | Age: 51
End: 2021-02-12
Payer: COMMERCIAL

## 2021-02-12 ENCOUNTER — PATIENT OUTREACH (OUTPATIENT)
Dept: ADMINISTRATIVE | Facility: HOSPITAL | Age: 51
End: 2021-02-12

## 2021-02-12 VITALS
SYSTOLIC BLOOD PRESSURE: 142 MMHG | HEART RATE: 85 BPM | BODY MASS INDEX: 29.35 KG/M2 | WEIGHT: 171.94 LBS | OXYGEN SATURATION: 100 % | DIASTOLIC BLOOD PRESSURE: 88 MMHG | TEMPERATURE: 97 F | HEIGHT: 64 IN

## 2021-02-12 DIAGNOSIS — I10 ESSENTIAL HYPERTENSION: ICD-10-CM

## 2021-02-12 DIAGNOSIS — H61.22 IMPACTED CERUMEN OF LEFT EAR: ICD-10-CM

## 2021-02-12 DIAGNOSIS — Z00.00 ANNUAL PHYSICAL EXAM: Primary | ICD-10-CM

## 2021-02-12 DIAGNOSIS — E03.9 HYPOTHYROIDISM, UNSPECIFIED TYPE: ICD-10-CM

## 2021-02-12 DIAGNOSIS — E87.1 HYPONATREMIA: ICD-10-CM

## 2021-02-12 DIAGNOSIS — R74.8 ELEVATED LIVER ENZYMES: ICD-10-CM

## 2021-02-12 PROCEDURE — 3079F PR MOST RECENT DIASTOLIC BLOOD PRESSURE 80-89 MM HG: ICD-10-PCS | Mod: CPTII,S$GLB,, | Performed by: INTERNAL MEDICINE

## 2021-02-12 PROCEDURE — 1126F PR PAIN SEVERITY QUANTIFIED, NO PAIN PRESENT: ICD-10-PCS | Mod: S$GLB,,, | Performed by: INTERNAL MEDICINE

## 2021-02-12 PROCEDURE — 99396 PREV VISIT EST AGE 40-64: CPT | Mod: S$GLB,,, | Performed by: INTERNAL MEDICINE

## 2021-02-12 PROCEDURE — 1126F AMNT PAIN NOTED NONE PRSNT: CPT | Mod: S$GLB,,, | Performed by: INTERNAL MEDICINE

## 2021-02-12 PROCEDURE — 99999 PR PBB SHADOW E&M-EST. PATIENT-LVL IV: ICD-10-PCS | Mod: PBBFAC,,, | Performed by: INTERNAL MEDICINE

## 2021-02-12 PROCEDURE — 3079F DIAST BP 80-89 MM HG: CPT | Mod: CPTII,S$GLB,, | Performed by: INTERNAL MEDICINE

## 2021-02-12 PROCEDURE — 3077F PR MOST RECENT SYSTOLIC BLOOD PRESSURE >= 140 MM HG: ICD-10-PCS | Mod: CPTII,S$GLB,, | Performed by: INTERNAL MEDICINE

## 2021-02-12 PROCEDURE — 99396 PR PREVENTIVE VISIT,EST,40-64: ICD-10-PCS | Mod: S$GLB,,, | Performed by: INTERNAL MEDICINE

## 2021-02-12 PROCEDURE — 3077F SYST BP >= 140 MM HG: CPT | Mod: CPTII,S$GLB,, | Performed by: INTERNAL MEDICINE

## 2021-02-12 PROCEDURE — 3008F BODY MASS INDEX DOCD: CPT | Mod: CPTII,S$GLB,, | Performed by: INTERNAL MEDICINE

## 2021-02-12 PROCEDURE — 99999 PR PBB SHADOW E&M-EST. PATIENT-LVL IV: CPT | Mod: PBBFAC,,, | Performed by: INTERNAL MEDICINE

## 2021-02-12 PROCEDURE — 3008F PR BODY MASS INDEX (BMI) DOCUMENTED: ICD-10-PCS | Mod: CPTII,S$GLB,, | Performed by: INTERNAL MEDICINE

## 2021-03-01 DIAGNOSIS — E03.9 HYPOTHYROIDISM, UNSPECIFIED TYPE: ICD-10-CM

## 2021-03-01 RX ORDER — LEVOTHYROXINE SODIUM 75 UG/1
TABLET ORAL
Qty: 30 TABLET | Refills: 0 | Status: SHIPPED | OUTPATIENT
Start: 2021-03-01 | End: 2021-04-12

## 2021-04-16 ENCOUNTER — PATIENT MESSAGE (OUTPATIENT)
Dept: RESEARCH | Facility: HOSPITAL | Age: 51
End: 2021-04-16

## 2021-07-06 ENCOUNTER — PATIENT MESSAGE (OUTPATIENT)
Dept: ADMINISTRATIVE | Facility: HOSPITAL | Age: 51
End: 2021-07-06

## 2021-07-20 ENCOUNTER — OFFICE VISIT (OUTPATIENT)
Dept: URGENT CARE | Facility: CLINIC | Age: 51
End: 2021-07-20
Payer: COMMERCIAL

## 2021-07-20 VITALS
TEMPERATURE: 98 F | RESPIRATION RATE: 16 BRPM | SYSTOLIC BLOOD PRESSURE: 139 MMHG | HEIGHT: 64 IN | WEIGHT: 160 LBS | OXYGEN SATURATION: 98 % | HEART RATE: 78 BPM | BODY MASS INDEX: 27.31 KG/M2 | DIASTOLIC BLOOD PRESSURE: 88 MMHG

## 2021-07-20 DIAGNOSIS — Z20.822 EXPOSURE TO COVID-19 VIRUS: Primary | ICD-10-CM

## 2021-07-20 DIAGNOSIS — R19.7 DIARRHEA, UNSPECIFIED TYPE: ICD-10-CM

## 2021-07-20 LAB
CTP QC/QA: YES
SARS-COV-2 RDRP RESP QL NAA+PROBE: NEGATIVE

## 2021-07-20 PROCEDURE — 3079F DIAST BP 80-89 MM HG: CPT | Mod: CPTII,S$GLB,, | Performed by: FAMILY MEDICINE

## 2021-07-20 PROCEDURE — U0002 COVID-19 LAB TEST NON-CDC: HCPCS | Mod: QW,S$GLB,, | Performed by: FAMILY MEDICINE

## 2021-07-20 PROCEDURE — 99213 PR OFFICE/OUTPT VISIT, EST, LEVL III, 20-29 MIN: ICD-10-PCS | Mod: S$GLB,CS,, | Performed by: FAMILY MEDICINE

## 2021-07-20 PROCEDURE — 3075F SYST BP GE 130 - 139MM HG: CPT | Mod: CPTII,S$GLB,, | Performed by: FAMILY MEDICINE

## 2021-07-20 PROCEDURE — 3008F BODY MASS INDEX DOCD: CPT | Mod: CPTII,S$GLB,, | Performed by: FAMILY MEDICINE

## 2021-07-20 PROCEDURE — 3075F PR MOST RECENT SYSTOLIC BLOOD PRESS GE 130-139MM HG: ICD-10-PCS | Mod: CPTII,S$GLB,, | Performed by: FAMILY MEDICINE

## 2021-07-20 PROCEDURE — 99213 OFFICE O/P EST LOW 20 MIN: CPT | Mod: S$GLB,CS,, | Performed by: FAMILY MEDICINE

## 2021-07-20 PROCEDURE — U0002: ICD-10-PCS | Mod: QW,S$GLB,, | Performed by: FAMILY MEDICINE

## 2021-07-20 PROCEDURE — 3079F PR MOST RECENT DIASTOLIC BLOOD PRESSURE 80-89 MM HG: ICD-10-PCS | Mod: CPTII,S$GLB,, | Performed by: FAMILY MEDICINE

## 2021-07-20 PROCEDURE — 3008F PR BODY MASS INDEX (BMI) DOCUMENTED: ICD-10-PCS | Mod: CPTII,S$GLB,, | Performed by: FAMILY MEDICINE

## 2021-10-04 ENCOUNTER — PATIENT MESSAGE (OUTPATIENT)
Dept: ADMINISTRATIVE | Facility: HOSPITAL | Age: 51
End: 2021-10-04

## 2021-12-08 DIAGNOSIS — I10 ESSENTIAL HYPERTENSION: ICD-10-CM

## 2021-12-10 RX ORDER — LABETALOL 100 MG/1
TABLET, FILM COATED ORAL
Qty: 180 TABLET | Refills: 0 | Status: SHIPPED | OUTPATIENT
Start: 2021-12-10 | End: 2022-06-27 | Stop reason: SDUPTHER

## 2022-01-01 NOTE — TELEPHONE ENCOUNTER
----- Message from Leonarda Conroy sent at 11/18/2019  2:41 PM CST -----  Contact: self  Patient is requesting a call back concerning getting an appt on Friday 11/22, states she also needs her medication refilled. Please call      0

## 2022-01-05 DIAGNOSIS — E03.9 HYPOTHYROIDISM, UNSPECIFIED TYPE: ICD-10-CM

## 2022-01-05 NOTE — TELEPHONE ENCOUNTER
No new care gaps identified.  Powered by [employer] "[employer]". Reference number: [id number].   1/05/2022 3:36:02 PM CST

## 2022-01-07 RX ORDER — LOSARTAN POTASSIUM 50 MG/1
TABLET ORAL
Qty: 90 TABLET | Refills: 0 | Status: SHIPPED | OUTPATIENT
Start: 2022-01-07 | End: 2022-04-07

## 2022-01-07 RX ORDER — LEVOTHYROXINE SODIUM 75 UG/1
TABLET ORAL
Qty: 90 TABLET | Refills: 0 | Status: SHIPPED | OUTPATIENT
Start: 2022-01-07 | End: 2022-04-07

## 2022-01-07 NOTE — TELEPHONE ENCOUNTER
Refill Authorization Note   Agnes Floyd  is requesting a refill authorization.  Brief Assessment and Rationale for Refill:  Approve     Medication Therapy Plan:  Ft4 WNL    Medication Reconciliation Completed: No   Comments:   --->Care Gap information included below if applicable.   Orders Placed This Encounter    losartan (COZAAR) 50 MG tablet    levothyroxine (SYNTHROID) 75 MCG tablet      Requested Prescriptions   Signed Prescriptions Disp Refills    losartan (COZAAR) 50 MG tablet 90 tablet 0     Sig: TAKE 1 TABLET BY MOUTH ONCE DAILY       Cardiovascular:  Angiotensin Receptor Blockers Passed - 1/5/2022  3:35 PM        Passed - Patient is at least 18 years old        Passed - Negative Pregnancy Status Check        Passed - Last BP in normal range within 360 days     BP Readings from Last 1 Encounters:   07/20/21 139/88               Passed - Valid encounter within last 15 months     Recent Visits  Date Type Provider Dept   02/12/21 Office Visit Thea Ceballos MD [facility]   08/21/20 Office Visit Thea Ceballos MD [facility]   02/14/20 Office Visit Thea Ceballos MD [facility]   Showing recent visits within past 720 days and meeting all other requirements  Future Appointments  No visits were found meeting these conditions.  Showing future appointments within next 150 days and meeting all other requirements                Passed - Cr is 1.39 or below and within 360 days     Lab Results   Component Value Date    CREATININE 0.8 02/10/2021    CREATININE 0.7 08/19/2020    CREATININE 0.8 10/04/2019              Passed - K is 5.2 or below and within 360 days     Potassium   Date Value Ref Range Status   02/10/2021 3.5 3.5 - 5.1 mmol/L Final   08/19/2020 4.0 3.5 - 5.1 mmol/L Final   10/04/2019 4.2 3.5 - 5.1 mmol/L Final              Passed - eGFR within 360 days     Lab Results   Component Value Date    EGFRNONAA >60.0 02/10/2021    EGFRNONAA >60.0 08/19/2020     EGFRNONAA >60.0 10/04/2019                  levothyroxine (SYNTHROID) 75 MCG tablet 90 tablet 0     Sig: TAKE 1 TABLET BY MOUTH ONCE DAILY       Endocrinology:  Hypothyroid Agents Failed - 1/5/2022  3:35 PM        Failed - TSH in normal range and within 360 days     TSH   Date Value Ref Range Status   02/10/2021 4.152 (H) 0.400 - 4.000 uIU/mL Final   10/04/2019 3.781 0.400 - 4.000 uIU/mL Final              Passed - Patient is at least 18 years old        Passed - Negative Pregnancy Status Check        Passed - Valid encounter within last 15 months     Recent Visits  Date Type Provider Dept   02/12/21 Office Visit Thea Ceballos MD [facility]   08/21/20 Office Visit Thea Ceballos MD [facility]   02/14/20 Office Visit Thea Ceballos MD [facility]   Showing recent visits within past 720 days and meeting all other requirements  Future Appointments  No visits were found meeting these conditions.  Showing future appointments within next 150 days and meeting all other requirements                Passed - Manual Review: FT4 is not required if last TSH is WNL.        Passed - T4 free within 1080 days     Free T4   Date Value Ref Range Status   02/10/2021 0.97 0.71 - 1.51 ng/dL Final                  Appointments  past 12m or future 3m with PCP    Date Provider   Last Visit   2/12/2021 Thea Ceballos MD   Next Visit   Visit date not found Thea Ceballos MD   ED visits in past 90 days: 0     Note composed:10:16 AM 01/07/2022

## 2022-04-05 DIAGNOSIS — E03.9 HYPOTHYROIDISM, UNSPECIFIED TYPE: ICD-10-CM

## 2022-04-05 NOTE — TELEPHONE ENCOUNTER
Care Due:                  Date            Visit Type   Department     Provider  --------------------------------------------------------------------------------                                EP -                              PRIMARY      [facility] INTERNAL  Last Visit: 02-      CARE ([facility])   MEDICINE       Thea Ceballos  Next Visit: None Scheduled  None         None Found                                                            Last  Test          Frequency    Reason                     Performed    Due Date  --------------------------------------------------------------------------------    Office Visit  12 months..  hydroCHLOROthiazide,       02- 02-                             losartan.................    CMP.........  12 months..  hydroCHLOROthiazide,       02-   02-                             losartan.................    Powered by [employer]. Reference number: [id number].   4/05/2022 12:52:38 PM CDT

## 2022-04-07 RX ORDER — LOSARTAN POTASSIUM 50 MG/1
TABLET ORAL
Qty: 90 TABLET | Refills: 0 | Status: SHIPPED | OUTPATIENT
Start: 2022-04-07 | End: 2022-07-05

## 2022-04-07 RX ORDER — LEVOTHYROXINE SODIUM 75 UG/1
TABLET ORAL
Qty: 90 TABLET | Refills: 0 | Status: SHIPPED | OUTPATIENT
Start: 2022-04-07 | End: 2022-07-05

## 2022-04-07 NOTE — TELEPHONE ENCOUNTER
Refill Routing Note   Medication(s) are not appropriate for processing by [facility] for the following reason(s):      - Required laboratory values are outdated  - Required laboratory values are abnormal    [facility] action(s):  Defer  Approve Medication-related problems identified: Requires labs              Appointments  past 12m or future 3m with PCP    Date Provider   Last Visit   2/12/2021 Thea Ceballos MD   Next Visit   Visit date not found Thea Ceballos MD   ED visits in past 90 days: 0        Note composed:8:38 AM 04/07/2022

## 2022-05-31 ENCOUNTER — PATIENT MESSAGE (OUTPATIENT)
Dept: ADMINISTRATIVE | Facility: HOSPITAL | Age: 52
End: 2022-05-31
Payer: COMMERCIAL

## 2022-06-08 DIAGNOSIS — Z12.11 COLON CANCER SCREENING: ICD-10-CM

## 2022-06-27 DIAGNOSIS — I10 ESSENTIAL HYPERTENSION: ICD-10-CM

## 2022-06-27 NOTE — TELEPHONE ENCOUNTER
----- Message from Yojana Regalado sent at 6/27/2022 12:34 PM CDT -----  Regarding: annual labs /refill  Contact: [phone]  Patient is requesting orders for Annual lab work sent to [facility].   Would the patient rather a call back or a response via [facility]?  Call   Best Call Back Number:  [phone]  Additional Information:      Type:  RX Refill Request    Who Called:  self   Refill or New Rx: refill  RX Name and Strength: labetaloL (NORMODYNE) 100 MG tablet  How is the patient currently taking it? (ex. 1XDay):  TAKE 1 TABLET BY MOUTH TWICE DAILY  Is this a 30 day or 90 day RX: 180 tablets  Preferred Pharmacy with phone number: [facility] - [id number] [facility]  Local or Mail Order: local   Ordering Provider:   Would the patient rather a call back or a response via [facility]?  Call   Best Call Back Number: [phone]  Additional Information:

## 2022-06-27 NOTE — TELEPHONE ENCOUNTER
Care Due:                  Date            Visit Type   Department     Provider  --------------------------------------------------------------------------------                                EP                               PRIMARY      [facility] INTERNAL  Last Visit: 02-      CARE ([facility])   MEDICINE       [facility]                              ANNUAL                              CHECKUP/PHY  [facility] INTERNAL  Next Visit: 10-      S            YUDITH Ceballos                                                            Last  Test          Frequency    Reason                     Performed    Due Date  --------------------------------------------------------------------------------    Office Visit  12 months..  hydroCHLOROthiazide,       02- 02-                             losartan.................    CMP.........  12 months..  hydroCHLOROthiazide,       02-   02-                             losartan.................    [employer] Embedded Care Gaps. Reference number: [id number]. 6/27/2022   4:48:42 PM CDT

## 2022-06-28 RX ORDER — LABETALOL 100 MG/1
100 TABLET, FILM COATED ORAL 2 TIMES DAILY
Qty: 180 TABLET | Refills: 0 | Status: SHIPPED | OUTPATIENT
Start: 2022-06-28 | End: 2022-08-08

## 2022-07-27 DIAGNOSIS — Z12.11 COLON CANCER SCREENING: ICD-10-CM

## 2022-08-01 ENCOUNTER — PATIENT MESSAGE (OUTPATIENT)
Dept: ADMINISTRATIVE | Facility: HOSPITAL | Age: 52
End: 2022-08-01
Payer: MEDICAID

## 2022-08-31 DIAGNOSIS — Z12.31 OTHER SCREENING MAMMOGRAM: ICD-10-CM

## 2022-09-14 DIAGNOSIS — I10 WHITE COAT SYNDROME WITH DIAGNOSIS OF HYPERTENSION: ICD-10-CM

## 2022-09-26 ENCOUNTER — PATIENT MESSAGE (OUTPATIENT)
Dept: ADMINISTRATIVE | Facility: HOSPITAL | Age: 52
End: 2022-09-26
Payer: MEDICAID

## 2022-09-26 ENCOUNTER — PATIENT OUTREACH (OUTPATIENT)
Dept: ADMINISTRATIVE | Facility: HOSPITAL | Age: 52
End: 2022-09-26
Payer: MEDICAID

## 2022-09-26 NOTE — PROGRESS NOTES
2022 Care Everywhere updates requested and reviewed.  Immunizations reconciled. Media reports reviewed.  Duplicate HM overrides and  orders removed.  Overdue HM topic chart audit and/or requested.  Overdue lab testing linked to upcoming lab appointments if applies.  Lab anabel, and quest reviewed  DIS reviewed        HM updated with external report. - PAP - DIS   Requested *  records - NO Current ANDREA      Health Maintenance Due   Topic Date Due    COVID-19 Vaccine (1) Never done    TETANUS VACCINE  Never done    Colorectal Cancer Screening  Never done    Shingles Vaccine (1 of 2) Never done    Mammogram  2022    Influenza Vaccine (1) Never done

## 2022-10-06 ENCOUNTER — TELEPHONE (OUTPATIENT)
Dept: INTERNAL MEDICINE | Facility: CLINIC | Age: 52
End: 2022-10-06
Payer: MEDICAID

## 2022-10-06 NOTE — TELEPHONE ENCOUNTER
Returned patients message, she wanted to know if she needed appt for labs. She stated that her message was already answered.

## 2022-10-06 NOTE — TELEPHONE ENCOUNTER
----- Message from Cheryl Nina sent at 10/6/2022  8:40 AM CDT -----  Type:  Orders     Who Called:pt   Would the patient rather a call back or a response via [facility]? Call back  Best Call Back Number:[phone]  Additional Information:     Requesting orders prior to appt on 10/10

## 2022-10-08 ENCOUNTER — LAB VISIT (OUTPATIENT)
Dept: LAB | Facility: HOSPITAL | Age: 52
End: 2022-10-08
Attending: INTERNAL MEDICINE
Payer: MEDICAID

## 2022-10-08 DIAGNOSIS — I10 WHITE COAT SYNDROME WITH DIAGNOSIS OF HYPERTENSION: ICD-10-CM

## 2022-10-08 LAB
ALBUMIN SERPL BCP-MCNC: 3.7 G/DL (ref 3.5–5.2)
ALP SERPL-CCNC: 62 U/L (ref 55–135)
ALT SERPL W/O P-5'-P-CCNC: 18 U/L (ref 10–44)
ANION GAP SERPL CALC-SCNC: 9 MMOL/L (ref 8–16)
AST SERPL-CCNC: 19 U/L (ref 10–40)
BILIRUB SERPL-MCNC: 0.9 MG/DL (ref 0.1–1)
BUN SERPL-MCNC: 6 MG/DL (ref 6–20)
CALCIUM SERPL-MCNC: 9.5 MG/DL (ref 8.7–10.5)
CHLORIDE SERPL-SCNC: 105 MMOL/L (ref 95–110)
CO2 SERPL-SCNC: 23 MMOL/L (ref 23–29)
CREAT SERPL-MCNC: 0.8 MG/DL (ref 0.5–1.4)
EST. GFR  (NO RACE VARIABLE): >60 ML/MIN/1.73 M^2
GLUCOSE SERPL-MCNC: 93 MG/DL (ref 70–110)
POTASSIUM SERPL-SCNC: 4.2 MMOL/L (ref 3.5–5.1)
PROT SERPL-MCNC: 6.9 G/DL (ref 6–8.4)
SODIUM SERPL-SCNC: 137 MMOL/L (ref 136–145)

## 2022-10-08 PROCEDURE — 36415 COLL VENOUS BLD VENIPUNCTURE: CPT | Mod: PO | Performed by: INTERNAL MEDICINE

## 2022-10-08 PROCEDURE — 80053 COMPREHEN METABOLIC PANEL: CPT | Performed by: INTERNAL MEDICINE

## 2022-10-10 ENCOUNTER — PATIENT MESSAGE (OUTPATIENT)
Dept: ADMINISTRATIVE | Facility: HOSPITAL | Age: 52
End: 2022-10-10
Payer: MEDICAID

## 2022-10-10 ENCOUNTER — OFFICE VISIT (OUTPATIENT)
Dept: INTERNAL MEDICINE | Facility: CLINIC | Age: 52
End: 2022-10-10
Payer: MEDICAID

## 2022-10-10 VITALS
DIASTOLIC BLOOD PRESSURE: 82 MMHG | WEIGHT: 160.69 LBS | BODY MASS INDEX: 27.43 KG/M2 | OXYGEN SATURATION: 96 % | HEART RATE: 91 BPM | HEIGHT: 64 IN | SYSTOLIC BLOOD PRESSURE: 148 MMHG

## 2022-10-10 DIAGNOSIS — J45.41 MODERATE PERSISTENT ASTHMA WITH ACUTE EXACERBATION: Primary | ICD-10-CM

## 2022-10-10 DIAGNOSIS — I10 ESSENTIAL HYPERTENSION: ICD-10-CM

## 2022-10-10 DIAGNOSIS — J30.2 SEASONAL ALLERGIC RHINITIS, UNSPECIFIED TRIGGER: ICD-10-CM

## 2022-10-10 PROCEDURE — 94640 AIRWAY INHALATION TREATMENT: CPT | Mod: PBBFAC,PO

## 2022-10-10 PROCEDURE — 99999 PR PBB SHADOW E&M-EST. PATIENT-LVL IV: ICD-10-PCS | Mod: PBBFAC,,, | Performed by: INTERNAL MEDICINE

## 2022-10-10 PROCEDURE — 4010F ACE/ARB THERAPY RXD/TAKEN: CPT | Mod: CPTII,,, | Performed by: INTERNAL MEDICINE

## 2022-10-10 PROCEDURE — 99999 PR PBB SHADOW E&M-EST. PATIENT-LVL IV: CPT | Mod: PBBFAC,,, | Performed by: INTERNAL MEDICINE

## 2022-10-10 PROCEDURE — 3077F PR MOST RECENT SYSTOLIC BLOOD PRESSURE >= 140 MM HG: ICD-10-PCS | Mod: CPTII,,, | Performed by: INTERNAL MEDICINE

## 2022-10-10 PROCEDURE — 99214 PR OFFICE/OUTPT VISIT, EST, LEVL IV, 30-39 MIN: ICD-10-PCS | Mod: S$PBB,,, | Performed by: INTERNAL MEDICINE

## 2022-10-10 PROCEDURE — 1159F MED LIST DOCD IN RCRD: CPT | Mod: CPTII,,, | Performed by: INTERNAL MEDICINE

## 2022-10-10 PROCEDURE — 1160F PR REVIEW ALL MEDS BY PRESCRIBER/CLIN PHARMACIST DOCUMENTED: ICD-10-PCS | Mod: CPTII,,, | Performed by: INTERNAL MEDICINE

## 2022-10-10 PROCEDURE — 99214 OFFICE O/P EST MOD 30 MIN: CPT | Mod: S$PBB,,, | Performed by: INTERNAL MEDICINE

## 2022-10-10 PROCEDURE — 3077F SYST BP >= 140 MM HG: CPT | Mod: CPTII,,, | Performed by: INTERNAL MEDICINE

## 2022-10-10 PROCEDURE — 3008F PR BODY MASS INDEX (BMI) DOCUMENTED: ICD-10-PCS | Mod: CPTII,,, | Performed by: INTERNAL MEDICINE

## 2022-10-10 PROCEDURE — 1160F RVW MEDS BY RX/DR IN RCRD: CPT | Mod: CPTII,,, | Performed by: INTERNAL MEDICINE

## 2022-10-10 PROCEDURE — 3079F DIAST BP 80-89 MM HG: CPT | Mod: CPTII,,, | Performed by: INTERNAL MEDICINE

## 2022-10-10 PROCEDURE — 4010F PR ACE/ARB THEARPY RXD/TAKEN: ICD-10-PCS | Mod: CPTII,,, | Performed by: INTERNAL MEDICINE

## 2022-10-10 PROCEDURE — 99214 OFFICE O/P EST MOD 30 MIN: CPT | Mod: PBBFAC,25,PO | Performed by: INTERNAL MEDICINE

## 2022-10-10 PROCEDURE — 96372 THER/PROPH/DIAG INJ SC/IM: CPT | Mod: PBBFAC,PO

## 2022-10-10 PROCEDURE — 3008F BODY MASS INDEX DOCD: CPT | Mod: CPTII,,, | Performed by: INTERNAL MEDICINE

## 2022-10-10 PROCEDURE — 1159F PR MEDICATION LIST DOCUMENTED IN MEDICAL RECORD: ICD-10-PCS | Mod: CPTII,,, | Performed by: INTERNAL MEDICINE

## 2022-10-10 PROCEDURE — 3079F PR MOST RECENT DIASTOLIC BLOOD PRESSURE 80-89 MM HG: ICD-10-PCS | Mod: CPTII,,, | Performed by: INTERNAL MEDICINE

## 2022-10-10 RX ORDER — ALBUTEROL SULFATE 0.63 MG/3ML
0.63 SOLUTION RESPIRATORY (INHALATION) EVERY 6 HOURS PRN
Qty: 75 ML | Refills: 3 | Status: SHIPPED | OUTPATIENT
Start: 2022-10-10 | End: 2023-10-10

## 2022-10-10 RX ORDER — PREDNISONE 20 MG/1
TABLET ORAL
Qty: 20 TABLET | Refills: 0 | Status: SHIPPED | OUTPATIENT
Start: 2022-10-10

## 2022-10-10 RX ORDER — ALBUTEROL SULFATE 90 UG/1
AEROSOL, METERED RESPIRATORY (INHALATION)
Qty: 18 G | Refills: 3 | Status: SHIPPED | OUTPATIENT
Start: 2022-10-10 | End: 2023-05-23 | Stop reason: SDUPTHER

## 2022-10-10 RX ORDER — AZELASTINE 1 MG/ML
1 SPRAY, METERED NASAL 2 TIMES DAILY
Qty: 30 ML | Refills: 11 | Status: SHIPPED | OUTPATIENT
Start: 2022-10-10 | End: 2023-10-10

## 2022-10-10 RX ORDER — IPRATROPIUM BROMIDE AND ALBUTEROL SULFATE 2.5; .5 MG/3ML; MG/3ML
3 SOLUTION RESPIRATORY (INHALATION)
Status: COMPLETED | OUTPATIENT
Start: 2022-10-10 | End: 2022-10-10

## 2022-10-10 RX ORDER — FLUTICASONE PROPIONATE AND SALMETEROL XINAFOATE 45; 21 UG/1; UG/1
2 AEROSOL, METERED RESPIRATORY (INHALATION) EVERY 12 HOURS
Qty: 12 G | Refills: 11 | Status: SHIPPED | OUTPATIENT
Start: 2022-10-10 | End: 2023-10-10

## 2022-10-10 RX ADMIN — IPRATROPIUM BROMIDE AND ALBUTEROL SULFATE 3 ML: .5; 2.5 SOLUTION RESPIRATORY (INHALATION) at 09:10

## 2022-10-10 RX ADMIN — METHYLPREDNISOLONE SODIUM SUCCINATE 125 MG: 125 INJECTION, POWDER, FOR SOLUTION INTRAMUSCULAR; INTRAVENOUS at 09:10

## 2022-10-10 NOTE — PROGRESS NOTES
Patient ID: Agnes Floyd is a 52 y.o. female.    Chief Complaint: Annual Exam    HPI Agnes is a 52 y.o. female with HTN, allergic rhinitis, anxiety, hypothyroidism who presents with chief complaint of wheezing.  She has had associated symptoms of chest tightness, cough, runny nose, postnasal drip, and hoarseness.  No associated symptoms of sinus pressure, ear fullness, or sore throat. Onset of symptoms was shortly after her last visit with me (spring 2021). Has used albuterol which has helped to relieve cough, chest tightness and wheezing.  She has tried Zyrtec, Claritin, Flonase, and Singulair for the upper airway symptoms, but has not had any relief with these.  Symptoms are constant in duration.  Nothing is completely relieving them at this time.  She has been seeing ENT Dr. Perera about these symptoms. Symptoms are worse at nighttime. She has never seen an allergy doctor. She has never had allergy testing. Of note, her sisters have asthma.     She stopped HCTZ months ago. She checks BP occasionally at home and it has been 130s/80s. We discussed that labetalol, being a beta blocker could contribute to bronchospasms. Discussed that we could change this to a more cardioselective beta blocker but she prefers to continue this medication at this time as she has been on it for many years with no issues.       Review of Systems   HENT:          See HPI   Respiratory:          See HPI   All other systems reviewed and are negative.      Objective:     Vitals:    10/10/22 0835   BP: (!) 148/82   Pulse: 91        Physical Exam  Vitals reviewed.   Constitutional:       General: She is not in acute distress.     Appearance: Normal appearance. She is well-developed. She is not ill-appearing, toxic-appearing or diaphoretic.   HENT:      Head: Normocephalic and atraumatic.      Right Ear: External ear normal.      Left Ear: External ear normal.      Nose: Nose normal.   Eyes:      General: No scleral icterus.         Right eye: No discharge.         Left eye: No discharge.      Extraocular Movements: Extraocular movements intact.      Conjunctiva/sclera: Conjunctivae normal.   Cardiovascular:      Rate and Rhythm: Normal rate and regular rhythm.      Heart sounds: Normal heart sounds. No murmur heard.    No friction rub. No gallop.   Pulmonary:      Effort: Pulmonary effort is normal. No respiratory distress.      Breath sounds: No stridor. Wheezing (diffuse, bilateral, heard on inhalation and exhalation) present. No rhonchi or rales.   Skin:     General: Skin is warm and dry.   Neurological:      General: No focal deficit present.      Mental Status: She is alert and oriented to person, place, and time. Mental status is at baseline.   Psychiatric:         Mood and Affect: Mood normal.         Behavior: Behavior normal.         Thought Content: Thought content normal.         Judgment: Judgment normal.       Assessment:       1. Moderate persistent asthma with acute exacerbation    2. Seasonal allergic rhinitis, unspecified trigger    3. Essential hypertension Chronic         Plan:         Moderate persistent asthma with acute exacerbation  Comments:  Suspected.   Orders:  -     Complete PFT w/ bronchodilator; Future  -     albuterol-ipratropium 2.5 mg-0.5 mg/3 mL nebulizer solution 3 mL  -     Discontinue: methylPREDNISolone sod suc(PF) injection 125 mg  -     Ambulatory referral/consult to Allergy; Future; Expected date: 10/17/2022  -     methylPREDNISolone sod suc(PF) injection 125 mg  -     albuterol (PROVENTIL/VENTOLIN HFA) 90 mcg/actuation inhaler; INHALE 2 PUFFS INTO THE LUNGS EVERY 4 HOURS AS NEEDED FOR WHEEZING OR SHORTNESS OF BREATH  Dispense: 18 g; Refill: 3  -     albuterol (ACCUNEB) 0.63 mg/3 mL Nebu; Take 3 mLs (0.63 mg total) by nebulization every 6 (six) hours as needed (cough, wheezing or shortness of breath). Rescue  Dispense: 75 mL; Refill: 3  -     predniSONE (DELTASONE) 20 MG tablet; Take 3 pills daily for 3  days, then 2 pills daily for 3 days, then 1 pill daily for 3 days, then 1/2 pill daily for 4 days.  Dispense: 20 tablet; Refill: 0  -     fluticasone propion-salmeterol 45-21 mcg/dose (ADVAIR HFA) 45-21 mcg/actuation HFAA inhaler; Inhale 2 puffs into the lungs every 12 (twelve) hours. Controller  Dispense: 12 g; Refill: 11    Seasonal allergic rhinitis, unspecified trigger  -     Ambulatory referral/consult to Allergy; Future; Expected date: 10/17/2022  -     azelastine (ASTELIN) 137 mcg (0.1 %) nasal spray; 1 spray (137 mcg total) by Nasal route 2 (two) times daily.  Dispense: 30 mL; Refill: 11    Essential hypertension  Comments:  Stay off HCTZ. Cont losartan. Cont to monitor.       RTC 1-2 months, f/u asthma      Warning signs discussed, patient to call with any further issues or worsening of symptoms.       Parts of the above note were dictated using a voice dictation software. Please excuse any grammatical or typographical errors.

## 2022-10-10 NOTE — PATIENT INSTRUCTIONS
Treatment of suspected asthma:  Steroid medication--tomorrow start the oral steroids I am prescribing  Breathing treatments (duoneb and/or albuterol)--use as needed for symptoms of cough, wheezing or shortness of breath or chest tightness  Advair used daily. This is to prevent symptoms from ever occurring.     Assessment:  Pulmonary function testing.   Do not use any inhalers on the day of your pulmonary function testing. If you do, they will cancel your testing.   If you are sick, cancel your PFTs and let me know.     For upper airway congestion and postnasal drip  -Continue flonase, add astelin, add xyzal, add daily nasal rinse like cirilo med sinus   -I also referred you to Allergy

## 2022-10-12 ENCOUNTER — TELEPHONE (OUTPATIENT)
Dept: ADMINISTRATIVE | Facility: OTHER | Age: 52
End: 2022-10-12
Payer: MEDICAID

## 2022-10-14 ENCOUNTER — PATIENT MESSAGE (OUTPATIENT)
Dept: ADMINISTRATIVE | Facility: HOSPITAL | Age: 52
End: 2022-10-14
Payer: MEDICAID

## 2022-11-02 DIAGNOSIS — Z12.31 OTHER SCREENING MAMMOGRAM: ICD-10-CM

## 2022-11-07 ENCOUNTER — PATIENT MESSAGE (OUTPATIENT)
Dept: ADMINISTRATIVE | Facility: HOSPITAL | Age: 52
End: 2022-11-07
Payer: MEDICAID

## 2022-12-16 LAB — HM MAMMOGRAM: NORMAL

## 2023-01-17 ENCOUNTER — PATIENT MESSAGE (OUTPATIENT)
Dept: ADMINISTRATIVE | Facility: HOSPITAL | Age: 53
End: 2023-01-17
Payer: MEDICAID

## 2023-05-23 DIAGNOSIS — E03.9 HYPOTHYROIDISM, UNSPECIFIED TYPE: ICD-10-CM

## 2023-05-23 DIAGNOSIS — J45.41 MODERATE PERSISTENT ASTHMA WITH ACUTE EXACERBATION: ICD-10-CM

## 2023-05-23 RX ORDER — LEVOTHYROXINE SODIUM 75 UG/1
75 TABLET ORAL DAILY
Qty: 90 TABLET | Refills: 0 | Status: SHIPPED | OUTPATIENT
Start: 2023-05-23 | End: 2023-08-29

## 2023-05-23 RX ORDER — LOSARTAN POTASSIUM 50 MG/1
50 TABLET ORAL DAILY
Qty: 90 TABLET | Refills: 0 | Status: SHIPPED | OUTPATIENT
Start: 2023-05-23 | End: 2023-12-26

## 2023-05-23 RX ORDER — ALBUTEROL SULFATE 90 UG/1
AEROSOL, METERED RESPIRATORY (INHALATION)
Qty: 18 G | Refills: 3 | Status: SHIPPED | OUTPATIENT
Start: 2023-05-23

## 2023-05-23 NOTE — TELEPHONE ENCOUNTER
Refill Routing Note   Medication(s) are not appropriate for processing by [facility] for the following reason(s):      Required labs outdated  Required vitals abnormal    [facility] action(s):  Defer  Approve Care Due:  Labs due   Medication Therapy Plan: defer losartan and levothyroxine; approve albuterol      Appointments  past 12m or future 3m with PCP    Date Provider   Last Visit   10/10/2022 Thea Ceballos MD   Next Visit   Visit date not found Thea Ceballos MD   ED visits in past 90 days: 0        Note composed:12:16 PM 05/23/2023

## 2023-05-23 NOTE — TELEPHONE ENCOUNTER
Care Due:                  Date            Visit Type   Department     Provider  --------------------------------------------------------------------------------                                [facility]                              ANNUAL                              CHECKUP/[facility] INTERNAL  Last Visit: 10-      [facility]       Thea Ceballos  Next Visit: None Scheduled  None         None Found                                                            Last  Test          Frequency    Reason                     Performed    Due Date  --------------------------------------------------------------------------------    TSH.........  12 months..  levothyroxine............  Not Found    Overdue    [employer] Embedded Care Due Messages. Reference number: [id number].   5/23/2023 6:56:05 AM CDT

## 2023-07-29 DIAGNOSIS — I10 ESSENTIAL HYPERTENSION: ICD-10-CM

## 2023-07-29 NOTE — TELEPHONE ENCOUNTER
Care Due:                  Date            Visit Type   Department     Provider  --------------------------------------------------------------------------------                                MYCHART                              ANNUAL                              CHECKUP/[facility] INTERNAL  Last Visit: 10-      [facility]       Thea Ceballos  Next Visit: None Scheduled  None         None Found                                                            Last  Test          Frequency    Reason                     Performed    Due Date  --------------------------------------------------------------------------------    Office Visit  12 months..  azelastine, fluticasone,   10-   10-                             labetaloL, levothyroxine,                             losartan.................    CMP.........  12 months..  losartan.................  10-   10-    TSH.........  12 months..  levothyroxine............  Not Found    Overdue    [employer] Embedded Care Due Messages. Reference number: [id number].   7/29/2023 9:03:58 AM CDT

## 2023-07-30 NOTE — TELEPHONE ENCOUNTER
Refill Routing Note   Medication(s) are not appropriate for processing by [facility] for the following reason(s):      Required vitals abnormal    [facility] action(s):  Defer Care Due:  Appointment due  Labs due            Appointments  past 12m or future 3m with PCP    Date Provider   Last Visit   10/10/2022 Thea Ceballos MD   Next Visit   Visit date not found Thea Ceballos MD   ED visits in past 90 days: 0        Note composed:1:38 AM 07/30/2023

## 2023-07-31 RX ORDER — LABETALOL 100 MG/1
100 TABLET, FILM COATED ORAL 2 TIMES DAILY
Qty: 180 TABLET | Refills: 0 | Status: SHIPPED | OUTPATIENT
Start: 2023-07-31 | End: 2023-10-27

## 2023-08-28 DIAGNOSIS — E03.9 HYPOTHYROIDISM, UNSPECIFIED TYPE: ICD-10-CM

## 2023-08-28 NOTE — TELEPHONE ENCOUNTER
No care due was identified.  [facility] Embedded Care Due Messages. Reference number: [id number].   8/28/2023 2:46:13 PM CDT

## 2023-08-29 RX ORDER — LEVOTHYROXINE SODIUM 75 UG/1
75 TABLET ORAL
Qty: 90 TABLET | Refills: 0 | Status: SHIPPED | OUTPATIENT
Start: 2023-08-29 | End: 2024-02-01

## 2023-08-29 NOTE — TELEPHONE ENCOUNTER
Refill Routing Note   Medication(s) are not appropriate for processing by [facility] for the following reason(s):      Required labs outdated    [facility] action(s):  Defer Care Due:  None identified              Appointments  past 12m or future 3m with PCP    Date Provider   Last Visit   10/10/2022 Thea Ceballos MD   Next Visit   Visit date not found Thea Ceballos MD   ED visits in past 90 days: 0        Note composed:10:07 AM 08/29/2023

## 2023-10-23 ENCOUNTER — PATIENT MESSAGE (OUTPATIENT)
Dept: ADMINISTRATIVE | Facility: HOSPITAL | Age: 53
End: 2023-10-23
Payer: MEDICAID

## 2023-10-25 ENCOUNTER — PATIENT OUTREACH (OUTPATIENT)
Dept: ADMINISTRATIVE | Facility: HOSPITAL | Age: 53
End: 2023-10-25
Payer: MEDICAID

## 2023-10-25 ENCOUNTER — TELEPHONE (OUTPATIENT)
Dept: INTERNAL MEDICINE | Facility: CLINIC | Age: 53
End: 2023-10-25
Payer: MEDICAID

## 2023-10-25 VITALS — SYSTOLIC BLOOD PRESSURE: 113 MMHG | DIASTOLIC BLOOD PRESSURE: 70 MMHG

## 2023-10-25 NOTE — PROGRESS NOTES
10/25/2023 Gap report audit performed. Care Everywhere updates requested and reviewed  Overdue HM topic chart audit and/or requested. LINKS triggered and reconciled. Media reviewed : Remote b/p 113/70

## 2023-10-27 DIAGNOSIS — I10 ESSENTIAL HYPERTENSION: ICD-10-CM

## 2023-10-27 RX ORDER — LABETALOL 100 MG/1
100 TABLET, FILM COATED ORAL 2 TIMES DAILY
Qty: 180 TABLET | Refills: 0 | Status: SHIPPED | OUTPATIENT
Start: 2023-10-27

## 2023-10-27 NOTE — TELEPHONE ENCOUNTER
Refill Routing Note   Medication(s) are not appropriate for processing by [facility] for the following reason(s):      Required vitals outdated    [facility] action(s):  Defer Care Due:  Appointment due  Labs due            Appointments  past 12m or future 3m with PCP    Date Provider   Last Visit   10/10/2022 Thae Ceballos MD   Next Visit   Visit date not found Thea Ceballos MD   ED visits in past 90 days: 0        Note composed:8:38 AM 10/27/2023

## 2023-10-27 NOTE — TELEPHONE ENCOUNTER
Care Due:                  Date            Visit Type   Department     Provider  --------------------------------------------------------------------------------                                MYCHART                              ANNUAL                              CHECKUP/[facility] INTERNAL  Last Visit: 10-      [facility]       Thea Ceballos  Next Visit: None Scheduled  None         None Found                                                            Last  Test          Frequency    Reason                     Performed    Due Date  --------------------------------------------------------------------------------    Office Visit  15 months..  azelastine, fluticasone,   10-   01-                             labetaloL, levothyroxine,                             losartan.................    CMP.........  12 months..  losartan.................  10-   10-    TSH.........  12 months..  levothyroxine............  Not Found    Overdue    [employer] Embedded Care Due Messages. Reference number: [id number].   10/27/2023 8:09:40 AM CDT

## 2023-12-23 NOTE — TELEPHONE ENCOUNTER
No care due was identified.  [facility] Embedded Care Due Messages. Reference number: [id number].   12/23/2023 12:08:36 PM CST

## 2023-12-25 NOTE — TELEPHONE ENCOUNTER
Refill Routing Note   Medication(s) are not appropriate for processing by [facility] for the following reason(s):        Required labs outdated    [facility] action(s):  Defer               Appointments  past 12m or future 3m with PCP    Date Provider   Last Visit   10/10/2022 Thea Ceballos MD   Next Visit   Visit date not found Thea Ceballos MD   ED visits in past 90 days: 0        Note composed:2:57 AM 12/25/2023

## 2023-12-26 RX ORDER — LOSARTAN POTASSIUM 50 MG/1
50 TABLET ORAL
Qty: 90 TABLET | Refills: 0 | Status: SHIPPED | OUTPATIENT
Start: 2023-12-26

## 2024-01-25 DIAGNOSIS — I10 ESSENTIAL HYPERTENSION: ICD-10-CM

## 2024-01-25 DIAGNOSIS — E03.9 HYPOTHYROIDISM, UNSPECIFIED TYPE: ICD-10-CM

## 2024-01-25 RX ORDER — LEVOTHYROXINE SODIUM 75 UG/1
75 TABLET ORAL
Qty: 90 TABLET | Refills: 0 | OUTPATIENT
Start: 2024-01-25

## 2024-01-25 RX ORDER — LABETALOL 100 MG/1
100 TABLET, FILM COATED ORAL 2 TIMES DAILY
Qty: 180 TABLET | Refills: 0 | OUTPATIENT
Start: 2024-01-25

## 2024-01-25 NOTE — TELEPHONE ENCOUNTER
Care Due:                  Date            Visit Type   Department     Provider  --------------------------------------------------------------------------------                                MYCNIKKIT                              ANNUAL                              CHECKUP/GREGORY JARAMILLO INTERNAL  Last Visit: 10-      [facility]       Thea Ceballos  Next Visit: None Scheduled  None         None Found                                                            Last  Test          Frequency    Reason                     Performed    Due Date  --------------------------------------------------------------------------------    Office Visit  15 months..  azelastine, fluticasone,   10-   01-                             labetaloL, levothyroxine,                             losartan.................    CMP.........  12 months..  losartan.................  10-   10-    TSH.........  12 months..  levothyroxine............  Not Found    Overdue    [employer] Embedded Care Due Messages. Reference number: [id number].   1/25/2024 8:16:34 AM CST

## 2024-01-26 NOTE — TELEPHONE ENCOUNTER
----- Message from Donald Jasso, DO sent at 1/25/2024 10:30 AM CST -----  Thea, she hasn't seen you since 2022  Looks like you were still filling meds until last month?  I did not fill meds as its been over a year    [facility], please make her an apt  Dr. Ceballos can choose to refill meds until that apt when she returns if she desires    - JM

## 2024-01-31 DIAGNOSIS — E03.9 HYPOTHYROIDISM, UNSPECIFIED TYPE: ICD-10-CM

## 2024-01-31 NOTE — TELEPHONE ENCOUNTER
No care due was identified.  [facility] Embedded Care Due Messages. Reference number: [id number].   1/31/2024 4:26:15 PM CST

## 2024-02-01 RX ORDER — LEVOTHYROXINE SODIUM 75 UG/1
75 TABLET ORAL
Qty: 90 TABLET | Refills: 0 | Status: SHIPPED | OUTPATIENT
Start: 2024-02-01

## 2024-03-13 DIAGNOSIS — I10 WHITE COAT SYNDROME WITH DIAGNOSIS OF HYPERTENSION: ICD-10-CM

## 2024-04-15 ENCOUNTER — PATIENT MESSAGE (OUTPATIENT)
Dept: ADMINISTRATIVE | Facility: HOSPITAL | Age: 54
End: 2024-04-15

## 2024-04-15 ENCOUNTER — PATIENT OUTREACH (OUTPATIENT)
Dept: ADMINISTRATIVE | Facility: HOSPITAL | Age: 54
End: 2024-04-15

## 2024-04-15 NOTE — PROGRESS NOTES
Health Maintenance Topic(s) Outreach Outcomes & Actions Taken:       Additional Notes:  Needs updated ANDREA on chart         Care Management, Digital Medicine, and/or Education Referrals

## 2024-04-24 DIAGNOSIS — E03.9 HYPOTHYROIDISM, UNSPECIFIED TYPE: ICD-10-CM

## 2024-04-24 DIAGNOSIS — I10 ESSENTIAL HYPERTENSION: ICD-10-CM

## 2024-04-24 RX ORDER — LABETALOL 100 MG/1
100 TABLET, FILM COATED ORAL 2 TIMES DAILY
Qty: 180 TABLET | Refills: 0 | OUTPATIENT
Start: 2024-04-24

## 2024-04-24 RX ORDER — LEVOTHYROXINE SODIUM 75 UG/1
75 TABLET ORAL
Qty: 90 TABLET | Refills: 0 | OUTPATIENT
Start: 2024-04-24

## 2024-04-24 NOTE — TELEPHONE ENCOUNTER
Care Due:                  Date            Visit Type   Department     Provider  --------------------------------------------------------------------------------                                MYCHART                              ANNUAL                              CHECKUP/GREGORY JARAMILLO INTERNAL  Last Visit: 10-      [facility]       Thea Ceballos  Next Visit: None Scheduled  None         None Found                                                            Last  Test          Frequency    Reason                     Performed    Due Date  --------------------------------------------------------------------------------    Office Visit  15 months..  azelastine, fluticasone,   10-   01-                             labetaloL, levothyroxine,                             losartan.................    CMP.........  12 months..  losartan.................  10-   10-    [facility] Embedded Care Due Messages. Reference number: [id number].   4/24/2024 11:43:39 AM CDT

## 2024-05-24 DIAGNOSIS — E03.9 HYPOTHYROIDISM, UNSPECIFIED TYPE: ICD-10-CM

## 2024-05-24 DIAGNOSIS — I10 ESSENTIAL HYPERTENSION: ICD-10-CM

## 2024-05-24 RX ORDER — LABETALOL 100 MG/1
100 TABLET, FILM COATED ORAL 2 TIMES DAILY
Qty: 180 TABLET | Refills: 0 | OUTPATIENT
Start: 2024-05-24

## 2024-05-24 RX ORDER — LEVOTHYROXINE SODIUM 75 UG/1
75 TABLET ORAL
Qty: 90 TABLET | Refills: 0 | OUTPATIENT
Start: 2024-05-24

## 2024-07-08 RX ORDER — LOSARTAN POTASSIUM 50 MG/1
50 TABLET ORAL
Qty: 90 TABLET | Refills: 1 | Status: SHIPPED | OUTPATIENT
Start: 2024-07-08

## 2024-07-10 ENCOUNTER — PATIENT MESSAGE (OUTPATIENT)
Dept: INTERNAL MEDICINE | Facility: CLINIC | Age: 54
End: 2024-07-10

## 2024-07-11 DIAGNOSIS — E03.9 HYPOTHYROIDISM, UNSPECIFIED TYPE: ICD-10-CM

## 2024-07-11 RX ORDER — LEVOTHYROXINE SODIUM 75 UG/1
75 TABLET ORAL
Qty: 90 TABLET | Refills: 0 | Status: SHIPPED | OUTPATIENT
Start: 2024-07-11

## 2024-08-23 ENCOUNTER — PATIENT MESSAGE (OUTPATIENT)
Dept: ADMINISTRATIVE | Facility: HOSPITAL | Age: 54
End: 2024-08-23

## 2024-09-30 DIAGNOSIS — E03.9 HYPOTHYROIDISM, UNSPECIFIED TYPE: ICD-10-CM

## 2024-10-01 DIAGNOSIS — I10 ESSENTIAL HYPERTENSION: ICD-10-CM

## 2024-10-01 RX ORDER — LEVOTHYROXINE SODIUM 75 UG/1
75 TABLET ORAL
Qty: 90 TABLET | Refills: 0 | Status: SHIPPED | OUTPATIENT
Start: 2024-10-01

## 2024-10-01 NOTE — TELEPHONE ENCOUNTER
Refill Routing Note   Medication(s) are not appropriate for processing by [facility] for the following reason(s):        Patient not seen by provider within 15 months  Required labs outdated  New or recently adjusted medication    [facility] action(s):  Defer               Appointments  past 12m or future 3m with PCP    Date Provider   Last Visit   Visit date not found Donald Jasso DO   Next Visit   Visit date not found Donald Jasso DO   ED visits in past 90 days: 0        Note composed:8:19 AM 10/01/2024

## 2024-10-01 NOTE — TELEPHONE ENCOUNTER
Refill Routing Note   Medication(s) are not appropriate for processing by [facility] for the following reason(s):        Non-participating provider    [facility] action(s):  Route               Appointments  past 12m or future 3m with PCP    Date Provider   Last Visit   10/10/2022 Thea Ceballos MD   Next Visit   Visit date not found Thea Ceballos MD   ED visits in past 90 days: 0        Note composed:6:28 PM 10/01/2024

## 2024-10-08 DIAGNOSIS — I10 ESSENTIAL HYPERTENSION: ICD-10-CM

## 2024-10-08 RX ORDER — LABETALOL 100 MG/1
100 TABLET, FILM COATED ORAL 2 TIMES DAILY
Qty: 180 TABLET | Refills: 0 | Status: SHIPPED | OUTPATIENT
Start: 2024-10-08

## 2024-10-08 RX ORDER — LABETALOL 100 MG/1
100 TABLET, FILM COATED ORAL 2 TIMES DAILY
Qty: 180 TABLET | Refills: 0 | OUTPATIENT
Start: 2024-10-08

## 2024-10-16 ENCOUNTER — PATIENT OUTREACH (OUTPATIENT)
Dept: ADMINISTRATIVE | Facility: HOSPITAL | Age: 54
End: 2024-10-16
Payer: MEDICAID

## 2024-10-16 NOTE — PROGRESS NOTES
Population Health Chart Review & Patient Outreach Details      Additional [facility] Health Notes:               Updates Requested / Reviewed:      Updated Care Coordination Note, Care Everywhere, and Immunizations Reconciliation Completed or Queried: [place]         Health Maintenance Topics Overdue:      VB Score: 3     Colon Cancer Screening  Uncontrolled BP  Hemoglobin A1c                       Health Maintenance Topic(s) Outreach Outcomes & Actions Taken:    Provider Pt Reattribution - Outreach Outcomes & Actions Taken  : Patient Declined Scheduling Appt or Will Call Back to Schedule and will reschedule appt with Dr. Parr

## 2025-01-26 NOTE — TELEPHONE ENCOUNTER
Refill Routing Note   Medication(s) are not appropriate for processing by [facility] for the following reason(s):        No PCP listed on profile; routed to previous prescribing provider   Outside of protocol  Requirement: Established PCP participating in [facility] program    [facility] action(s):                        Appointments  past 12m or future 3m with PCP    Date Provider   Last Visit   Visit date not found Kyle Parr DO   Next Visit   Visit date not found Kyle Parr DO   ED visits in past 90 days: 0        Note composed:12:40 PM 01/26/2025

## 2025-01-27 RX ORDER — LOSARTAN POTASSIUM 50 MG/1
50 TABLET ORAL DAILY
Qty: 30 TABLET | Refills: 0 | Status: SHIPPED | OUTPATIENT
Start: 2025-01-27

## 2025-02-25 ENCOUNTER — PATIENT MESSAGE (OUTPATIENT)
Dept: FAMILY MEDICINE | Facility: CLINIC | Age: 55
End: 2025-02-25
Payer: MEDICAID

## 2025-02-26 ENCOUNTER — PATIENT MESSAGE (OUTPATIENT)
Dept: FAMILY MEDICINE | Facility: CLINIC | Age: 55
End: 2025-02-26
Payer: MEDICAID

## 2025-03-19 ENCOUNTER — PATIENT MESSAGE (OUTPATIENT)
Dept: FAMILY MEDICINE | Facility: CLINIC | Age: 55
End: 2025-03-19
Payer: MEDICAID

## 2025-03-19 RX ORDER — LOSARTAN POTASSIUM 50 MG/1
50 TABLET ORAL
Qty: 30 TABLET | Refills: 0 | OUTPATIENT
Start: 2025-03-19

## 2025-03-19 NOTE — TELEPHONE ENCOUNTER
Refill Routing Note   Medication(s) are not appropriate for processing by [facility] for the following reason(s):        Non-participating provider  Responsible provider unclear    [facility] action(s):  Route             Appointments  past 12m or future 3m with PCP    Date Provider   Last Visit   Visit date not found Inna Villalobos NP   Next Visit   Visit date not found Inna Villalobos NP   ED visits in past 90 days: 0        Note composed:11:08 AM 03/19/2025

## 2025-04-21 DIAGNOSIS — I10 ESSENTIAL HYPERTENSION: ICD-10-CM

## 2025-04-21 RX ORDER — LABETALOL 100 MG/1
100 TABLET, FILM COATED ORAL 2 TIMES DAILY
Qty: 180 TABLET | Refills: 0 | Status: SHIPPED | OUTPATIENT
Start: 2025-04-21

## 2025-04-21 NOTE — TELEPHONE ENCOUNTER
Refill Routing Note   Medication(s) are not appropriate for processing by [facility] for the following reason(s):        Required vitals outdated  Patient not seen by provider within 15 months  ED/Hospital Visit since last OV with provider  Responsible provider unclear  New or recently adjusted medication    [facility] action(s):  Defer             Appointments  past 12m or future 3m with PCP    Date Provider   Last Visit   Visit date not found Kyle Parr DO   Next Visit   Visit date not found Kyle Parr DO   ED visits in past 90 days: 0        Note composed:1:10 PM 04/21/2025

## 2025-05-20 DIAGNOSIS — E03.9 HYPOTHYROIDISM, UNSPECIFIED TYPE: ICD-10-CM

## 2025-05-20 RX ORDER — LEVOTHYROXINE SODIUM 75 UG/1
75 TABLET ORAL
Qty: 90 TABLET | Refills: 0 | OUTPATIENT
Start: 2025-05-20

## 2025-05-20 NOTE — TELEPHONE ENCOUNTER
Refill Routing Note   Medication(s) are not appropriate for processing by [facility] for the following reason(s):        Required labs outdated  Responsible provider unclear    [facility] action(s):  Defer               Appointments  past 12m or future 3m with PCP    Date Provider   Last Visit   Visit date not found Donald Jasso DO   Next Visit   Visit date not found Donald Jasso DO   ED visits in past 90 days: 0        Note composed:2:41 PM 05/20/2025

## 2025-08-18 DIAGNOSIS — I10 ESSENTIAL HYPERTENSION: ICD-10-CM

## 2025-08-18 RX ORDER — LABETALOL 100 MG/1
100 TABLET, FILM COATED ORAL 2 TIMES DAILY
Qty: 180 TABLET | Refills: 0 | OUTPATIENT
Start: 2025-08-18